# Patient Record
Sex: FEMALE | Race: BLACK OR AFRICAN AMERICAN
[De-identification: names, ages, dates, MRNs, and addresses within clinical notes are randomized per-mention and may not be internally consistent; named-entity substitution may affect disease eponyms.]

---

## 2017-02-08 ENCOUNTER — HOSPITAL ENCOUNTER (OUTPATIENT)
Dept: HOSPITAL 62 - WI | Age: 47
End: 2017-02-08
Attending: CLINIC/CENTER
Payer: OTHER GOVERNMENT

## 2017-02-08 DIAGNOSIS — Z12.31: Primary | ICD-10-CM

## 2017-02-08 PROCEDURE — G0202 SCR MAMMO BI INCL CAD: HCPCS

## 2017-02-08 PROCEDURE — 77067 SCR MAMMO BI INCL CAD: CPT

## 2017-02-16 ENCOUNTER — HOSPITAL ENCOUNTER (EMERGENCY)
Dept: HOSPITAL 62 - ER | Age: 47
Discharge: HOME | End: 2017-02-16
Payer: OTHER GOVERNMENT

## 2017-02-16 VITALS — SYSTOLIC BLOOD PRESSURE: 135 MMHG | DIASTOLIC BLOOD PRESSURE: 79 MMHG

## 2017-02-16 DIAGNOSIS — R07.9: Primary | ICD-10-CM

## 2017-02-16 LAB
ALBUMIN SERPL-MCNC: 4.2 G/DL (ref 3.5–5)
ALP SERPL-CCNC: 71 U/L (ref 38–126)
ALT SERPL-CCNC: 29 U/L (ref 9–52)
ANION GAP SERPL CALC-SCNC: 10 MMOL/L (ref 5–19)
AST SERPL-CCNC: 24 U/L (ref 14–36)
BASOPHILS # BLD AUTO: 0 10^3/UL (ref 0–0.2)
BASOPHILS NFR BLD AUTO: 0.6 % (ref 0–2)
BILIRUB DIRECT SERPL-MCNC: 0 MG/DL (ref 0–0.3)
BILIRUB SERPL-MCNC: 0.5 MG/DL (ref 0.2–1.3)
BUN SERPL-MCNC: 8 MG/DL (ref 7–20)
CALCIUM: 9.4 MG/DL (ref 8.4–10.2)
CHLORIDE SERPL-SCNC: 99 MMOL/L (ref 98–107)
CK MB SERPL-MCNC: < 0.22 NG/ML (ref ?–4.55)
CK SERPL-CCNC: 51 U/L (ref 30–135)
CO2 SERPL-SCNC: 30 MMOL/L (ref 22–30)
CREAT SERPL-MCNC: 0.8 MG/DL (ref 0.52–1.25)
EOSINOPHIL # BLD AUTO: 0 10^3/UL (ref 0–0.6)
EOSINOPHIL NFR BLD AUTO: 0.4 % (ref 0–6)
ERYTHROCYTE [DISTWIDTH] IN BLOOD BY AUTOMATED COUNT: 14.5 % (ref 11.5–14)
GLUCOSE SERPL-MCNC: 80 MG/DL (ref 75–110)
HCT VFR BLD CALC: 37.6 % (ref 36–47)
HGB BLD-MCNC: 12.2 G/DL (ref 12–15.5)
HGB HCT DIFFERENCE: -1
LYMPHOCYTES # BLD AUTO: 2 10^3/UL (ref 0.5–4.7)
LYMPHOCYTES NFR BLD AUTO: 30 % (ref 13–45)
MCH RBC QN AUTO: 25.9 PG (ref 27–33.4)
MCHC RBC AUTO-ENTMCNC: 32.5 G/DL (ref 32–36)
MCV RBC AUTO: 80 FL (ref 80–97)
MONOCYTES # BLD AUTO: 0.5 10^3/UL (ref 0.1–1.4)
MONOCYTES NFR BLD AUTO: 7.6 % (ref 3–13)
NEUTROPHILS # BLD AUTO: 4.1 10^3/UL (ref 1.7–8.2)
NEUTS SEG NFR BLD AUTO: 61.4 % (ref 42–78)
POTASSIUM SERPL-SCNC: 4.2 MMOL/L (ref 3.6–5)
PROT SERPL-MCNC: 7.8 G/DL (ref 6.3–8.2)
RBC # BLD AUTO: 4.71 10^6/UL (ref 3.72–5.28)
SODIUM SERPL-SCNC: 139.3 MMOL/L (ref 137–145)
TROPONIN I SERPL-MCNC: < 0.012 NG/ML
WBC # BLD AUTO: 6.7 10^3/UL (ref 4–10.5)

## 2017-02-16 PROCEDURE — 80053 COMPREHEN METABOLIC PANEL: CPT

## 2017-02-16 PROCEDURE — 93005 ELECTROCARDIOGRAM TRACING: CPT

## 2017-02-16 PROCEDURE — 82550 ASSAY OF CK (CPK): CPT

## 2017-02-16 PROCEDURE — 82553 CREATINE MB FRACTION: CPT

## 2017-02-16 PROCEDURE — 84484 ASSAY OF TROPONIN QUANT: CPT

## 2017-02-16 PROCEDURE — 99285 EMERGENCY DEPT VISIT HI MDM: CPT

## 2017-02-16 PROCEDURE — 85025 COMPLETE CBC W/AUTO DIFF WBC: CPT

## 2017-02-16 PROCEDURE — 93010 ELECTROCARDIOGRAM REPORT: CPT

## 2017-02-16 PROCEDURE — 36415 COLL VENOUS BLD VENIPUNCTURE: CPT

## 2017-02-16 PROCEDURE — 71010: CPT

## 2017-02-16 NOTE — ER DOCUMENT REPORT
ED Medical Screen (RME)





- General


Stated Complaint: CHEST PAIN


Notes: 


patient is a 46 year old 


4 days ago, substernal pressure that is constant without any wax/waning. hurts 

worse with deep breathing. 





-tobacco use, BC, recent travel, recent surgery, cancer h/o





-htn, hld, GERD


+Dm





I have greeted and performed a rapid initial assessment of this patient. A 

comprehensive ED assessment and evaluation of the patient, analysis of test 

results and completion of the medical decision making process will be conducted 

by additional ED providers.


TRAVEL OUTSIDE OF THE [place] IN LAST 30 DAYS: No





- Related Data


Allergies/Adverse Reactions: 


 





hydrocodone Adverse Reaction (Verified 06/16/16 08:11)


 











Past Medical History





- Past Medical History


Cardiac Medical History: 


   Denies: Hx Coronary Artery Disease, Hx Heart Attack, Hx Hypertension


Pulmonary Medical History: 


   Denies: Hx Asthma, Hx Bronchitis, Hx COPD, Hx Pneumonia


Neurological Medical History: Denies: Hx Cerebrovascular Accident, Hx Seizures


Musculoskeltal Medical History: Reports Hx Arthritis - hands and knees 





- Immunizations


Hx Diphtheria, Pertussis, Tetanus Vaccination: Yes





Physical Exam





- Vital signs


Vitals: 





 











Temp Pulse BP Pulse Ox


 


 98.0 F   72   137/79 H  98 


 


 02/16/17 13:20  02/16/17 13:20  02/16/17 13:20  02/16/17 13:20














Course





- Vital Signs


Vital signs: 





 











Temp Pulse Resp BP Pulse Ox


 


 98.0 F   72      137/79 H  98 


 


 02/16/17 13:20  02/16/17 13:20     02/16/17 13:20  02/16/17 13:20

## 2017-02-16 NOTE — ER DOCUMENT REPORT
ED Cardiac





- General


Chief Complaint: Chest Pain


Stated Complaint: CHEST PAIN


Notes: 


The patient is a 46-year-old female, past medical history prediabetes, presents 

with 3 days of right sternal chest pain that is worse when she moves.  She is 

never had this in the past.  Does not remember lifting any heavy objects.  She 

says the pain is worse when she pushes on the area.  Denies shortness of breath

, nausea, vomiting, leg swelling, recent travel, recent surgery, history of 

malignancy, birth control use, cough, fevers or back pain.


TRAVEL OUTSIDE OF THE [place] IN LAST 30 DAYS: No





- Related Data


Allergies/Adverse Reactions: 


 





hydrocodone Adverse Reaction (Verified 02/16/17 13:24)


 











Past Medical History





- General


Information source: Patient





- Social History


Smoking Status: Never Smoker


Chew tobacco use (# tins/day): No


Frequency of alcohol use: None


Drug Abuse: None


Family History: Reviewed & Not Pertinent


Patient has suicidal ideation: No


Patient has homicidal ideation: No





- Past Medical History


Cardiac Medical History: 


   Denies: Hx Coronary Artery Disease, Hx Heart Attack, Hx Hypertension


Pulmonary Medical History: 


   Denies: Hx Asthma, Hx Bronchitis, Hx COPD, Hx Pneumonia


Neurological Medical History: Denies: Hx Cerebrovascular Accident, Hx Seizures


Renal/ Medical History: Denies: Hx Peritoneal Dialysis


Musculoskeltal Medical History: Reports Hx Arthritis - hands and knees 


Past Surgical History: Reports: Hx Orthopedic Surgery - R SHOULDER REPLACEMENT, 

Hx Thyroid Surgery, Hx Tubal Ligation





- Immunizations


Hx Diphtheria, Pertussis, Tetanus Vaccination: Yes





Review of Systems





- Review of Systems


Notes: 


REVIEW OF SYSTEMS:


CONSTITUTIONAL: -fevers, -chills


EENT: -eye pain, -difficulty swallowing, -nasal congestion


CARDIOVASCULAR: +chest pain, -syncope.


RESPIRATORY: -cough, -SOB


GASTROINTESTINAL: -abdominal pain, - nausea, -vomiting, -diarrhea


GENITOURINARY: -dysuria, -hematuria


MUSCULOSKELETAL: -back pain, -neck pain


SKIN: -rash or skin lesions.


HEMATOLOGIC: -easy bruising or bleeding.


LYMPHATIC: -swollen, enlarged glands.


NEUROLOGICAL: -altered mental status or loss of consciousness, -headache, -

neurologic symptoms


PSYCHIATRIC: -anxiety, -depression.


ALL OTHER SYSTEMS REVIEWED AND NEGATIVE.





Physical Exam





- Vital signs


Vitals: 


 











Temp Pulse BP Pulse Ox


 


 98.0 F   72   137/79 H  98 


 


 02/16/17 13:20  02/16/17 13:20  02/16/17 13:20  02/16/17 13:20














- Notes


Notes: 


PHYSICAL EXAMINATION:





GENERAL: Well-appearing, well-nourished and in no acute distress.





HEAD: Atraumatic, normocephalic.





EYES: Pupils equal round and reactive to light, extraocular movements intact, 

sclera anicteric, conjunctiva are normal.





ENT: nares patent, oropharynx clear without exudates.  Moist mucous membranes.





NECK: Normal range of motion, supple without lymphadenopathy





LUNGS: Breath sounds clear to auscultation bilaterally and equal.  No wheezes 

rales or rhonchi.





HEART: Right mid-sternal tenderness. Regular rate and rhythm without murmurs





ABDOMEN: Soft, nontender, normoactive bowel sounds.  No guarding, no rebound.  

No masses appreciated.





EXTREMITIES: Normal range of motion, no pitting or edema.  No cyanosis.





NEUROLOGICAL: Cranial nerves grossly intact.  Normal speech, normal gait.  

Normal sensory, motor, and reflex exams.





PSYCH: Normal mood, normal affect.





SKIN: Warm, Dry, normal turgor, no rashes or lesions noted.





Course





- Re-evaluation


Re-evalutation: 


2 sets of troponins are negative. HEART score of 2. PERC negative.  Symptoms 

atypical for aortic dissection.  Patient says she will call her primary care 

physician tomorrow morning for an appointment. Given strict return precautions 

and she understands.





- Vital Signs


Vital signs: 


 











Temp Pulse Resp BP Pulse Ox


 


 98.0 F   72      137/79 H  98 


 


 02/16/17 13:20  02/16/17 13:20     02/16/17 13:20  02/16/17 13:20














- Laboratory


Result Diagrams: 


 02/16/17 13:30





 02/16/17 13:30


Laboratory results interpreted by me: 


 











  02/16/17





  13:30


 


MCH  25.9 L


 


RDW  14.5 H














Discharge





- Discharge


Clinical Impression: 


Chest pain


Qualifiers:


 Chest pain type: unspecified Qualified Code(s): R07.9 - Chest pain, unspecified





Condition: Good


Disposition: HOME, SELF-CARE


Additional Instructions: 


Call your primary care physician tomorrow to make an appointment to have your 

chest pain rechecked.  Return immediately to the emergency room if he noticed 

worsening chest pain or shortness of breath.








CHEST PAIN OF UNCLEAR CAUSE:





     The exact cause of your chest pain isn't clear.  Fortunately, there is no 

evidence of a dangerous medical condition.  Further testing may be required to 

find the source of the pain.


     Most often, we find that this pain is coming from the chest wall -- the 

muscles or rib joints in the chest.  But chest pain can come from the lung and 

lung lining, the esophagus, the heart valves or heart lining, and even the 

stomach or gallbladder.


     Rest.  Eat lightly until the pain is gone.  We may prescribe medicine for 

pain and inflammation.


     You should call the physician immediately if the pain radiates to the 

shoulder, jaw or arms; if you start to run a fever or develop a cough; or if 

you develop shortness of breath, or other new or alarming symptoms.








NORMAL EXAM AND WORKUP:


     At this time, your examination and workup show no significant abnormality.

  No significant abnormal physical findings were noted.  All laboratory, EKG, 

and imaging (x-ray, CT scans, ultrasound) studies that were ordered show no 

significant abnormality.


     Although your examination and all studies that were ordered showed no 

significant abnormal finding, there are no examinations and no studies that are 

100% accurate.  There is always the possibility that some abnormality could 

exist and not be detected with physical examination or within the limits and 

capabilities of laboratory and other studies.


     You should return or follow up as you were instructed on your visit today 

for further evaluation if your symptoms do not resolve.








CHEST WALL PAIN:


     Your chest pain may be coming from the chest wall. This is often caused by 

straining the muscles or joints in the chest during physical activity, direct 

trauma, coughing, or vigorous vomiting.  Persons with arthritis are especially 

prone to this type of pain, due to inflammation of the cartilage joints near 

the breast bone. Occasionally, no cause can be found.


     Rest from strenuous physical activity.  This kind of chest pain is usually 

made worse by movement of the chest.  Depending on the symptoms, we may 

prescribe medicine for pain, muscle relaxation, and antiinflammatory effects.


     If the pain is new, and seems to be due to muscle strain, cold packs can 

help.  Otherwise, apply gentle warmth to the painful area for 15 minutes every 

hour or two.


     You should call contact the doctor immediately if things change. Further 

evaluation is needed if you develop a fever or cough, if the nature of the pain 

changes, or if you become short of breath.








FOLLOW-UP CARE:


If you have been referred to a physician for follow-up care, call the physician

s office for an appointment as you were instructed or within the next two days.

  If you experience worsening or a significant change in your symptoms, notify 

the physician immediately or return to the Emergency Department at any time for 

re-evaluation.

## 2017-02-17 NOTE — EKG REPORT
SEVERITY:- ABNORMAL ECG -

SINUS RHYTHM

VENTRICULAR PREMATURE COMPLEX

RIGHT AXIS DEVIATION

PROBABLE INFERIOR INFARCT, OLD

:

Confirmed by: Elgin Valverde 17-Feb-2017 12:34:49

## 2017-05-17 ENCOUNTER — HOSPITAL ENCOUNTER (OUTPATIENT)
Dept: HOSPITAL 62 - WI | Age: 47
End: 2017-05-17
Attending: CLINIC/CENTER
Payer: OTHER GOVERNMENT

## 2017-05-17 DIAGNOSIS — R59.0: ICD-10-CM

## 2017-05-17 DIAGNOSIS — N64.4: Primary | ICD-10-CM

## 2017-05-17 PROCEDURE — 76642 ULTRASOUND BREAST LIMITED: CPT

## 2017-06-14 ENCOUNTER — HOSPITAL ENCOUNTER (OUTPATIENT)
Dept: HOSPITAL 62 - RAD | Age: 47
End: 2017-06-14
Attending: SURGERY
Payer: OTHER GOVERNMENT

## 2017-06-14 DIAGNOSIS — R59.0: Primary | ICD-10-CM

## 2017-06-14 DIAGNOSIS — N63: ICD-10-CM

## 2017-06-14 PROCEDURE — 77059: CPT

## 2017-06-14 PROCEDURE — C8906 MRI W/CONT, BREAST,  BI: HCPCS

## 2017-06-14 PROCEDURE — A9576 INJ PROHANCE MULTIPACK: HCPCS

## 2017-06-15 ENCOUNTER — HOSPITAL ENCOUNTER (OUTPATIENT)
Dept: HOSPITAL 62 - WI | Age: 47
End: 2017-06-15
Attending: SURGERY
Payer: OTHER GOVERNMENT

## 2017-06-15 DIAGNOSIS — C50.412: Primary | ICD-10-CM

## 2017-06-15 DIAGNOSIS — C77.3: ICD-10-CM

## 2017-06-15 PROCEDURE — 19084 BX BREAST ADD LESION US IMAG: CPT

## 2017-06-15 PROCEDURE — 88342 IMHCHEM/IMCYTCHM 1ST ANTB: CPT

## 2017-06-15 PROCEDURE — 88341 IMHCHEM/IMCYTCHM EA ADD ANTB: CPT

## 2017-06-15 PROCEDURE — 19083 BX BREAST 1ST LESION US IMAG: CPT

## 2017-06-15 PROCEDURE — 0HBU3ZX EXCISION OF LEFT BREAST, PERCUTANEOUS APPROACH, DIAGNOSTIC: ICD-10-PCS | Performed by: SURGERY

## 2017-06-15 PROCEDURE — 88305 TISSUE EXAM BY PATHOLOGIST: CPT

## 2017-06-15 NOTE — RADIOLOGY REPORT (SQ)
EXAM DESCRIPTION:  MRI BREAST BILAT W AND/OR WO



COMPLETED DATE/TIME:  6/14/2017 8:49 am



REASON FOR STUDY:  AXILLARY LYMPHADENOPATHY (R59.0) R59.0  LOCALIZED ENLARGED LYMPH NODES



COMPARISON:  Mammography and ultrasound



PATHOLOGIC CORRELATION:  None.



CONTRAST TYPE AND DOSE:  20 mL Prohance.



RENAL FUNCTION:  None required. The patient is less than 50 years old.



TECHNIQUE:  MR imaging performed with a dedicated breast coil. Pre contrast T1 and T2 weighted images
.  Pre contrast and post contrast enhanced T1 weighted images with fat saturation.

Subtraction images, 3D thick and thin MIPS, and kinetic analysis performed on an independent workstat
ion. ([employer] workstation)

Magnet strength: 1.5 T



LIMITATIONS:  None.



FINDINGS:  BREAST DENSITY: b. There are scattered areas of fibroglandular density.

BACKGROUND PARENCHYMAL ENHANCEMENT:Mild.

RIGHT BREAST: No enhancing or suspicious masses.   No clumped, regional/segmental ductal enhancement.


CHEST WALL: Normal tissue planes. No abnormal internal mammary nodes.

AXILLA:  Normal axillary and retro-pectoral nodes.

LEFT BREAST:There are multiple micro lobular heterogeneously enhancing masses in the axillary tail of
 the breast extending to the axilla.  Type 3 enhancement curves.  The largest mass is 5 cm in maximum
 diameter.  There is generalize asymmetric enhancement through the entire breast.  There is skin thic
kening.

CHEST WALL:  Normal tissue planes. No abnormal internal mammary nodes.

 AXILLA: Enlarged axillary nodes just adjacent to the dominant mass which is either an axillary node 
or mass in the tail of the breast.

OTHER:No identified liver, bone, or lung lesions. No other significant incidental findings.



IMPRESSION:  BI-RADS 5 left breast.  Axillary adenopathy with diffuse breast enhancement and skin thi
ckening all worrisome for inflammatory breast carcinoma.



BIRAD:  RIGHT BREAST: 1 Negative.

LEFT BREAST: 5 Highly suggestive of malignancy. Appropriate action should be taken.



RECOMMENDATION:  RECOMMENDED FOLLOW-UP: Ultrasound-guided core biopsy of the dominant mass in the lef
t breast.



TECHNICAL DOCUMENTATION:  JOB ID:  [id number]

 2011 [employer]- All Rights Reserved

## 2017-06-20 ENCOUNTER — HOSPITAL ENCOUNTER (OUTPATIENT)
Dept: HOSPITAL 62 - OROUT | Age: 47
Discharge: HOME | End: 2017-06-20
Attending: SURGERY
Payer: OTHER GOVERNMENT

## 2017-06-20 VITALS — SYSTOLIC BLOOD PRESSURE: 131 MMHG | DIASTOLIC BLOOD PRESSURE: 87 MMHG

## 2017-06-20 DIAGNOSIS — E03.9: ICD-10-CM

## 2017-06-20 DIAGNOSIS — M06.9: ICD-10-CM

## 2017-06-20 DIAGNOSIS — C50.912: Primary | ICD-10-CM

## 2017-06-20 DIAGNOSIS — R59.0: ICD-10-CM

## 2017-06-20 DIAGNOSIS — F32.9: ICD-10-CM

## 2017-06-20 DIAGNOSIS — Z87.891: ICD-10-CM

## 2017-06-20 DIAGNOSIS — Z79.899: ICD-10-CM

## 2017-06-20 LAB
ALBUMIN SERPL-MCNC: 4.1 G/DL (ref 3.5–5)
ALP SERPL-CCNC: 70 U/L (ref 38–126)
ALT SERPL-CCNC: 25 U/L (ref 9–52)
ANION GAP SERPL CALC-SCNC: 9 MMOL/L (ref 5–19)
AST SERPL-CCNC: 25 U/L (ref 14–36)
BASOPHILS # BLD AUTO: 0 10^3/UL (ref 0–0.2)
BASOPHILS NFR BLD AUTO: 1 % (ref 0–2)
BILIRUB DIRECT SERPL-MCNC: 0.3 MG/DL (ref 0–0.4)
BILIRUB SERPL-MCNC: 0.6 MG/DL (ref 0.2–1.3)
BUN SERPL-MCNC: 10 MG/DL (ref 7–20)
CALCIUM: 9.2 MG/DL (ref 8.4–10.2)
CHLORIDE SERPL-SCNC: 105 MMOL/L (ref 98–107)
CO2 SERPL-SCNC: 28 MMOL/L (ref 22–30)
CREAT SERPL-MCNC: 0.6 MG/DL (ref 0.52–1.25)
EOSINOPHIL # BLD AUTO: 0 10^3/UL (ref 0–0.6)
EOSINOPHIL NFR BLD AUTO: 0.6 % (ref 0–6)
ERYTHROCYTE [DISTWIDTH] IN BLOOD BY AUTOMATED COUNT: 15.1 % (ref 11.5–14)
GLUCOSE SERPL-MCNC: 92 MG/DL (ref 75–110)
HCT VFR BLD CALC: 37.6 % (ref 36–47)
HGB BLD-MCNC: 11.8 G/DL (ref 12–15.5)
HGB HCT DIFFERENCE: -2.2
LYMPHOCYTES # BLD AUTO: 1.5 10^3/UL (ref 0.5–4.7)
LYMPHOCYTES NFR BLD AUTO: 32.6 % (ref 13–45)
MCH RBC QN AUTO: 25 PG (ref 27–33.4)
MCHC RBC AUTO-ENTMCNC: 31.5 G/DL (ref 32–36)
MCV RBC AUTO: 79 FL (ref 80–97)
MONOCYTES # BLD AUTO: 0.4 10^3/UL (ref 0.1–1.4)
MONOCYTES NFR BLD AUTO: 8.8 % (ref 3–13)
NEUTROPHILS # BLD AUTO: 2.6 10^3/UL (ref 1.7–8.2)
NEUTS SEG NFR BLD AUTO: 57 % (ref 42–78)
POTASSIUM SERPL-SCNC: 3.9 MMOL/L (ref 3.6–5)
PROT SERPL-MCNC: 7.9 G/DL (ref 6.3–8.2)
RBC # BLD AUTO: 4.74 10^6/UL (ref 3.72–5.28)
SODIUM SERPL-SCNC: 142.1 MMOL/L (ref 137–145)
WBC # BLD AUTO: 4.5 10^3/UL (ref 4–10.5)

## 2017-06-20 PROCEDURE — 81025 URINE PREGNANCY TEST: CPT

## 2017-06-20 PROCEDURE — C1752 CATH,HEMODIALYSIS,SHORT-TERM: HCPCS

## 2017-06-20 PROCEDURE — C1788 PORT, INDWELLING, IMP: HCPCS

## 2017-06-20 PROCEDURE — 36561 INSERT TUNNELED CV CATH: CPT

## 2017-06-20 PROCEDURE — 71010: CPT

## 2017-06-20 PROCEDURE — 19100 BX BREAST PERCUT W/O IMAGE: CPT

## 2017-06-20 PROCEDURE — C1769 GUIDE WIRE: HCPCS

## 2017-06-20 PROCEDURE — 88305 TISSUE EXAM BY PATHOLOGIST: CPT

## 2017-06-20 PROCEDURE — 05HM33Z INSERTION OF INFUSION DEVICE INTO RIGHT INTERNAL JUGULAR VEIN, PERCUTANEOUS APPROACH: ICD-10-PCS | Performed by: SURGERY

## 2017-06-20 PROCEDURE — 80053 COMPREHEN METABOLIC PANEL: CPT

## 2017-06-20 PROCEDURE — 0HBU3ZX EXCISION OF LEFT BREAST, PERCUTANEOUS APPROACH, DIAGNOSTIC: ICD-10-PCS | Performed by: SURGERY

## 2017-06-20 PROCEDURE — 85025 COMPLETE CBC W/AUTO DIFF WBC: CPT

## 2017-06-20 PROCEDURE — 77001 FLUOROGUIDE FOR VEIN DEVICE: CPT

## 2017-06-20 PROCEDURE — 36415 COLL VENOUS BLD VENIPUNCTURE: CPT

## 2017-06-20 NOTE — RADIOLOGY REPORT (SQ)
EXAM DESCRIPTION:  CHEST SINGLE VIEW



COMPLETED DATE/TIME:  6/20/2017 10:51 am



REASON FOR STUDY:  preop/ PACU 10



COMPARISON:  2/16/2017



EXAM PARAMETERS:  NUMBER OF VIEWS: One view.

TECHNIQUE: Single frontal radiographic view of the chest acquired.

RADIATION DOSE: NA

LIMITATIONS: None.



FINDINGS:  LUNGS AND PLEURA: No opacities, masses or pneumothorax. No pleural effusion.

MEDIASTINUM AND HILAR STRUCTURES: No masses.  Contour normal.

HEART AND VASCULAR STRUCTURES: Heart normal in size.  Normal vasculature.

BONES: No acute findings.

HARDWARE: None in the chest.

OTHER: No other significant finding.



IMPRESSION:  NO ACUTE RADIOGRAPHIC FINDING IN THE CHEST.



TECHNICAL DOCUMENTATION:  JOB ID:  [id number]

## 2017-06-20 NOTE — RADIOLOGY REPORT (SQ)
EXAM DESCRIPTION:  CHEST SINGLE VIEW



COMPLETED DATE/TIME:  6/20/2017 3:30 pm



REASON FOR STUDY:  post op pacu



COMPARISON:  Chest film 2/16/2017



EXAM PARAMETERS:  NUMBER OF VIEWS: One view.

TECHNIQUE: Single frontal radiographic view of the chest acquired.

RADIATION DOSE: NA

LIMITATIONS: None.



FINDINGS:  LUNGS AND PLEURA: No opacities, masses or pneumothorax. No pleural effusion.

MEDIASTINUM AND HILAR STRUCTURES: No masses.  Contour normal.

HEART AND VASCULAR STRUCTURES: Stable mild cardiomegaly.  Normal vasculature.

BONES: No acute findings.

HARDWARE: Right-sided permanent central line tip superior vena cava.

OTHER: No other significant finding.



IMPRESSION:  Post right permanent central line placement with the tip in the superior vena cava.  No 
pneumothorax.

Stable mild cardiomegaly



TECHNICAL DOCUMENTATION:  JOB ID:  [id number]

## 2017-06-20 NOTE — PDOC DISCHARGE SUMMARY
Discharge Summary (SDC)





- Discharge


Final Diagnosis: 


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


Date of Surgery: 06/20/17


Discharge Date: 06/20/17


Condition: Good


Treatment or Instructions: 


Right internal jugular single-lumen PowerPort placement.  Left breast punch 

skin biopsy.  Left breast core needle biopsies.  May discharge patient home 

when met discharge criteria.  Follow-up with me next week.  May shower 

tomorrow.  Take the Band-Aid off of her left breast tomorrow.


Prescriptions: 


Oxycodone HCl/Acetaminophen [Percocet 5-325 mg Tablet] 1 tab PO ASDIR PRN #25 

tablet


 PRN Reason: 


Discharge Diet: As Tolerated


Discharge Activity: Activity As Tolerated - Stay active but avoid strenuous 

activity.


Report the Following to Your Physician Immediately: Fever over 101 Degrees, 

Unusual Bleeding, Drainage-Foul Smelling

## 2017-06-20 NOTE — RADIOLOGY REPORT (SQ)
EXAM DESCRIPTION:  FLUORO/CV PLACEMENT



COMPLETED DATE/TIME:  6/20/2017 2:29 pm



REASON FOR STUDY:  PORTACATH C50.912  MALIGNANT NEOPLASM OF UNSPECIFIED SITE OF LEFT FEMAL



COMPARISON:  AP chest 6/20/2017



FLUOROSCOPY TIME:  14.1 minutes

12 series of digital  images saved to PACS.



TECHNIQUE:  Intra-operative images acquired during surgical procedure to evaluate progress.

NUMBER OF IMAGES: 12 series of digital fluoro images



LIMITATIONS:  None.



FINDINGS:  Intra procedural imaging and fluoro during placement of a right-sided permanent central li
ne with the tip in the superior vena cava.  Please see the operative report for further details



IMPRESSION:  Intra procedural imaging and fluoro



COMMENT:  Quality :  Final reports for procedures using fluoroscopy that document radiation exp
osure indices, or exposure time and number of fluorographic images (if radiation exposure indices are
 not available)

Please consult full operative report of the attending physician for description of the procedure.



TECHNICAL DOCUMENTATION:  JOB ID:  [id number]

 2011 [employer]- All Rights Reserved

## 2017-06-21 NOTE — WOMENS IMAGING REPORT
EXAM DESCRIPTION:  U/S BREAST BX; U/S BREAST BX EACH ADDT'L



COMPLETED DATE/TIME:  6/15/2017 12:27 pm; 6/15/2017 12:28 pm



REASON FOR STUDY:  R59.0 R59.0  LOCALIZED ENLARGED LYMPH NODES



COMPARISON:  MRI breast 6/14/2017, mammograms and breast ultrasound 5/17/2017



TECHNIQUE:  The procedure was discussed with the patient and the patient agreed to proceed.  Enlarged
 left axillary lymph node was targeted.  There was a vague area of breast parenchymal shadowing in th
e far upper outer quadrant adjacent to the lymph node which was also targeted.

The patient was scanned and the area of interest in the far upper outer quadrant left breast.  An enl
arged axillary lymph node was localized, with a vague area of breast parenchymal shadowing in the far
 upper outer quadrant.  These areas correlate with the area of concern on prior imaging studies.  The
se areas were targeted for ultrasound-guided core biopsy.

LEFT AXILLA:

After sterile skin prep and 3.5 mL local lidocaine 1% for skin and deep tissue anesthesia, a 14 gauge
 coaxial core biopsy needle was used to obtain several cores of tissue from the lesion.  Under ultras
ound guidance, a ribbon clip was placed in the areas sampled.  There were no immediate post-procedure
 complications.

LEFT UPPER OUTER QUADRANT:

After sterile skin prep and 3 mL local lidocaine 1% for skin and deep tissue anesthesia, a 14 gauge c
oaxial core biopsy needle was used to obtain several cores of tissue from the lesion.  Under ultrasou
nd guidance, a ribbon clip was placed in the areas sampled.  There were no immediate post-procedure c
omplications.

MAMMOGRAM: Post-procedure two view mammogram was not acquired in the digital mammogram suite.

Pathology yields a diagnosis of metastatic high-grade carcinoma in the left axillary lymph node and i
n the upper outer quadrant breast parenchyma

Pathology is concordant.



LIMITATIONS:  None.



FINDINGS:  Ultrasound guided breast biopsy as described above.

POST PROCEDURE MAMMOGRAMS FOR MARKER PLACEMENT: No



IMPRESSION:  ULTRASOUND-GUIDED CORE BIOPSY OF THE LEFT BREAST YIELDS A DIAGNOSIS OF MALIGNANCY



COMMENT:  COMMUNICATION: RESULTS OF THE BIOPSY WERE DISCUSSED WITH THE PATIENT, 6/19/2017 1700 HOURS

Patient medication list reviewed: Yes- Quality ID# [id number]:Eligible professional attests to documenting i
n the medical record they obtained, updated, or reviewed the patient's current medications.



TECHNICAL DOCUMENTATION:  JOB ID:  [id number]

 2011 [employer]- All Rights Reserved

## 2017-06-21 NOTE — WOMENS IMAGING REPORT
EXAM DESCRIPTION:  U/S BREAST BX; U/S BREAST BX EACH ADDT'L



COMPLETED DATE/TIME:  6/15/2017 12:27 pm; 6/15/2017 12:28 pm



REASON FOR STUDY:  R59.0 R59.0  LOCALIZED ENLARGED LYMPH NODES



COMPARISON:  MRI breast 6/14/2017, mammograms and breast ultrasound 5/17/2017



TECHNIQUE:  The procedure was discussed with the patient and the patient agreed to proceed.  Enlarged
 left axillary lymph node was targeted.  There was a vague area of breast parenchymal shadowing in th
e far upper outer quadrant adjacent to the lymph node which was also targeted.

The patient was scanned and the area of interest in the far upper outer quadrant left breast.  An enl
arged axillary lymph node was localized, with a vague area of breast parenchymal shadowing in the far
 upper outer quadrant.  These areas correlate with the area of concern on prior imaging studies.  The
se areas were targeted for ultrasound-guided core biopsy.

LEFT AXILLA:

After sterile skin prep and 3.5 mL local lidocaine 1% for skin and deep tissue anesthesia, a 14 gauge
 coaxial core biopsy needle was used to obtain several cores of tissue from the lesion.  Under ultras
ound guidance, a ribbon clip was placed in the areas sampled.  There were no immediate post-procedure
 complications.

LEFT UPPER OUTER QUADRANT:

After sterile skin prep and 3 mL local lidocaine 1% for skin and deep tissue anesthesia, a 14 gauge c
oaxial core biopsy needle was used to obtain several cores of tissue from the lesion.  Under ultrasou
nd guidance, a ribbon clip was placed in the areas sampled.  There were no immediate post-procedure c
omplications.

MAMMOGRAM: Post-procedure two view mammogram was not acquired in the digital mammogram suite.

Pathology yields a diagnosis of metastatic high-grade carcinoma in the left axillary lymph node and i
n the upper outer quadrant breast parenchyma

Pathology is concordant.



LIMITATIONS:  None.



FINDINGS:  Ultrasound guided breast biopsy as described above.

POST PROCEDURE MAMMOGRAMS FOR MARKER PLACEMENT: No



IMPRESSION:  ULTRASOUND-GUIDED CORE BIOPSY OF THE LEFT BREAST YIELDS A DIAGNOSIS OF MALIGNANCY



COMMENT:  COMMUNICATION: RESULTS OF THE BIOPSY WERE DISCUSSED WITH THE PATIENT, 6/19/2017 1700 HOURS

Patient medication list reviewed: Yes- Quality ID# [id number]:Eligible professional attests to documenting i
n the medical record they obtained, updated, or reviewed the patient's current medications.



TECHNICAL DOCUMENTATION:  JOB ID:  [id number]

 2011 [employer]- All Rights Reserved

## 2017-06-22 NOTE — OPERATIVE REPORT
Operative Report


DATE OF SURGERY: 06/20/17


PREOPERATIVE DIAGNOSIS: Left breast cancer.


POSTOPERATIVE DIAGNOSIS: Left breast cancer


OPERATION: Right internal jugular single-lumen PowerPort placement (permanent 

implanted central venous access placed under fluoroscopic and ultrasound 

guidance).  Left breast skin punch biopsy.  Left breast core needle biopsies.


SURGEON: MARY VENTURA ASSISTANT: LENNOX WILLIAMS


ANESTHESIA: LMAC


TISSUE REMOVED OR ALTERED: Left breast periareolar skin punch biopsy.  Left 

upper outer breast multiple core needle biopsies.


COMPLICATIONS: 


None


ESTIMATED BLOOD LOSS: 30 cc


INTRAOPERATIVE FINDINGS: Fullness in the left upper quadrant of the left breast 

along with subtle skin thickening along with nipple retraction.  Large mass at 

the left axilla.


PROCEDURE: 


Informed consent was obtained.  Patient was brought to the operating room 

placed on the operating table in the supine position.  Procedure was done under 

LMAC.  Her bilateral breast and chest and neck were prepped and draped in the 

usual sterile fashion.  After administration of local anesthetic the right 

subclavian vein was entered and guidewire was placed.  However the guidewire 

kept on going across to the left side.  Despite multiple technical measures.  A 

Glidewire was used and still it kept him going to the left side.  The needle 

was pulled and a different angle of approach was taken entering the left 

subclavian vein.  After multiple manipulations of the Glidewire the Glidewire 

finally appeared to go down to what appeared to be the superior vena cava and 

the right atrium.  However the positioning looked slightly more medial than I 

would expect therefore after placement of a angiocatheter through the Glidewire

, contrast study was obtained.  It demonstrated a variant appearing anatomy.  

At this point I obtain help from Dr. Lennox Williams (vascular surgeon) who 

placed a angiocatheter into the right internal jugular vein under ultrasound 

guidance.  The Glidewire appeared to go to in the same location as the 

subclavian approach.  Contrast was injected into the right internal jugular 

vein and again it showed the same a variant appearing anatomy.  However it was 

100% certain that it was then the vein.  Therefore a introducer catheter was 

placed.  A right upper chest subcutaneous pocket was created.  Single-lumen 

PowerPort catheter was then tunneled between the 2 incisions and the catheter 

was fed into the superior vena cava through the introducer catheter.  The 

catheter was cut to length and attached to the PowerPort device which was then 

implanted into the subcutaneous pocket.  Fluoroscopic views demonstrated good 

positioning of the catheter.  Hemostasis appeared excellent.  The PowerPort 

withdrew blood and flushed easily.  All skin incisions were closed with 

subcuticular interrupted Vicryl sutures followed by Dermabond closure.





Patient had a subtle skin thickening diffusely of her left breast more 

pronounced than the left upper and left upper outer breast in the periareolar 

region with associated nipple retraction.  At the periareolar region at the 

upper outer region local anesthetic was administered and a 4 mm punch biopsy of 

the skin was obtained. the specimen was submitted to pathology.  Hemostasis was 

achieved with electrocautery in the subcutaneous tissue and the wound was 

closed with interrupted nylon sutures.  Patient had fullness of the left upper 

outer breast but not a distinct mass in this area.  She did have a large mass 

in her left axilla.  After administration of local anesthetic a incision was 

made in the skin allowing placement of a core needle biopsy device and multiple 

core needle biopsies were taken of the left upper outer breast.  The small stab 

incision was closed with interrupted nylon suture.  Patient tolerated procedure 

well with no apparent complications and was taken to the recovery area in 

stable condition.

## 2017-06-26 ENCOUNTER — HOSPITAL ENCOUNTER (OUTPATIENT)
Dept: HOSPITAL 62 - RAD | Age: 47
End: 2017-06-26
Attending: INTERNAL MEDICINE
Payer: OTHER GOVERNMENT

## 2017-06-26 DIAGNOSIS — C50.412: Primary | ICD-10-CM

## 2017-06-26 PROCEDURE — A9560 TC99M LABELED RBC: HCPCS

## 2017-06-26 PROCEDURE — 78472 GATED HEART PLANAR SINGLE: CPT

## 2017-06-26 NOTE — RADIOLOGY REPORT (SQ)
EXAM DESCRIPTION:  NM MUGA REST



COMPLETED DATE/TIME:  6/26/2017 1:17 pm



REASON FOR STUDY:  MAL DANELLE OF UPPER OTHER QUADRANT OF LEFT FEMALE BREAST C50.412  MALIG NEOPLASM OF U
PPER-OUTER QUADRANT OF LEFT FEMAL Z08  ENCNTR FOR FOLLOW-UP EXAM AFTER TRTMT FOR MALIGNANT NEOP Z51.1
1  ENCOUNTER FOR ANTINEOPLASTIC CHEMOTHERAPY



COMPARISON:  No previous



RADIONUCLIDE AND DOSE:  26.9 mCi of technetium 99 M pyrophosphate was tagged to the patient's own red
 cells for MUGA scan



TECHNIQUE:  Following administration of the radionuclide, gated images of the heart are obtained in t
hree projections.  Left ventricular functional analysis performed.



LIMITATIONS:  None.



FINDINGS:   LEFT VENTRICULAR FUNCTION:

EJECTION FRACTION: 61%.

END-DIASTOLIC VOLUME: 199 mL.

END-SYSTOLIC VOLUME: 66 mL.

WALL MOTION: No focal wall motion abnormalities.

OTHER: No other significant finding.



IMPRESSION:  NORMAL CARDIAC MUGA STUDY.  Estimated left ventricular ejection fraction 61%.



TECHNICAL DOCUMENTATION:  JOB ID:  [id number]

 2011 [employer]- All Rights Reserved

## 2017-10-22 ENCOUNTER — HOSPITAL ENCOUNTER (OUTPATIENT)
Dept: HOSPITAL 62 - RAD | Age: 47
End: 2017-10-22
Attending: INTERNAL MEDICINE
Payer: OTHER GOVERNMENT

## 2017-10-22 DIAGNOSIS — C50.412: Primary | ICD-10-CM

## 2017-10-22 PROCEDURE — 78815 PET IMAGE W/CT SKULL-THIGH: CPT

## 2017-10-22 PROCEDURE — A9552 F18 FDG: HCPCS

## 2017-10-23 NOTE — RADIOLOGY REPORT (SQ)
EXAM DESCRIPTION:  PET CT SKULL/THIGH



COMPLETED DATE/TIME:  10/22/2017 6:57 pm



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



COMPARISON:  Bilateral breast MRI 6/14/2017

PET-CT [facility] 6/28/2017



RADIONUCLIDE AND DOSE:  11.7 mCi F18 FDG

The route of agent administration: Intravenous



FASTING BLOOD SUGAR:  88 mg/dl



CONTRAST TYPE AND DOSE:  No CT contrast given.



TECHNIQUE:  Blood glucose level was verified.  Above dose of FDG was injected intravenously.  2-D seg
mented attenuation correction images were obtained from the base of the skull to the midthighs.  Nonc
ontrast CT images were obtained for attenuation correction and fusion with emission images.  CT image
s were performed without oral or intravenous contrast and are not sensitive for parenchymal lesions. 
 A series of overlapping emission PET images were obtained.  Images reviewed and manipulated at [facility] work station by the radiologist.  Images stored on PACS.



LIMITATIONS:  None.



FINDINGS:  HEAD AND NECK: No areas of abnormal metabolic activity in the soft tissues of the head and
 neck.  Left supraclavicular adenopathy seen on 6/28/2017 has resolved.

CHEST:  There is soft tissue activity inferior to the left axillary lymph node without a measurable c
orresponding mass by CT.  SUV in this area is 3.5, of uncertain clinical significance.  Corresponding
 CT images 74-76 demonstrate no focal mass.

There is a left axillary lymph node 3 x 2.4 cm in size with SUV less than 1 (was 7.3 x 4 cm in size w
ith SUV of 9 on 6/28/2017 PET-CT).

Left lateral rib activity described 6/28/2017 is no longer seen.

Right hilar metabolically active lymph nodes seen on 6/28/2017 are no longer identified.

ABDOMEN AND PELVIS: No areas of abnormal metabolic activity in the abdomen or pelvis.  Expected physi
ologic activity is present in the genitourinary system and bowel.

PROXIMAL LOWER EXTREMITIES: No areas of abnormal metabolic activity in the soft tissues of the lower 
extremities.

BONES: No abnormal bony activity on today's study.  No sclerotic lesions in the spine are identified 
today.

ADDITIONAL CT FINDINGS: Right permanent central line catheter tip in the superior vena cava.  Diffuse
 left breast periareolar skin thickening without increased metabolic activity.

OTHER: Liver background SUV 1.4.  Blood pool background SUV 1.1.



IMPRESSION:  Enlarged hypermetabolic left axillary lymph node seen on 6/28/2017 is non metabolic on t
kate's study.

Inferior to the left axillary lymph node, there is a bandlike area of increased activity SUV 3.5, wit
hout a CT discernible mass.  This is of uncertain clinical significance.

Left supraclavicular hypermetabolic nodes seen 6/28/2017 have resolved.

No hypermetabolic lesions in the lower thoracic spine, left lateral ribs or L1 level.



TECHNICAL DOCUMENTATION:  JOB ID:  [id number]

 2011 [employer]- All Rights Reserved

## 2017-10-31 ENCOUNTER — HOSPITAL ENCOUNTER (OUTPATIENT)
Dept: HOSPITAL 62 - RAD | Age: 47
End: 2017-10-31
Attending: INTERNAL MEDICINE
Payer: OTHER GOVERNMENT

## 2017-10-31 DIAGNOSIS — C50.412: Primary | ICD-10-CM

## 2017-10-31 PROCEDURE — 77059: CPT

## 2017-10-31 PROCEDURE — C8906 MRI W/CONT, BREAST,  BI: HCPCS

## 2017-10-31 PROCEDURE — A9576 INJ PROHANCE MULTIPACK: HCPCS

## 2017-11-02 NOTE — RADIOLOGY REPORT (SQ)
EXAM DESCRIPTION:  MRI BREAST BILAT W AND/OR WO



COMPLETED DATE/TIME:  10/31/2017 1:25 pm



REASON FOR STUDY:  BREAST CA (C50.412) C50.412  MALIG NEOPLASM OF UPPER-OUTER QUADRANT OF LEFT FEMAL



COMPARISON:  6/14/2017



PATHOLOGIC CORRELATION:  Left breast carcinoma



CONTRAST TYPE AND DOSE:  20 mL Prohance.



RENAL FUNCTION:  None required. The patient is less than 50 years old.



TECHNIQUE:  MR imaging performed with a dedicated breast coil. Pre contrast T1 and T2 weighted images
.  Pre contrast and post contrast enhanced T1 weighted images with fat saturation.

Subtraction images, 3D thick and thin MIPS, and kinetic analysis performed on an independent workstat
ion. ([employer] workstation)

Magnet strength: 1.5 T



LIMITATIONS:  None.



FINDINGS:  BREAST DENSITY: c. The breasts are heterogeneously dense, which may obscure small masses.

BACKGROUND PARENCHYMAL ENHANCEMENT:Mild.

RIGHT BREAST: No enhancing or suspicious masses.   No clumped, regional/segmental ductal enhancement.


CHEST WALL: Normal tissue planes. No abnormal internal mammary nodes.

AXILLA:  Normal axillary and retro-pectoral nodes.

LEFT BREAST:Generalized increased enhancement throughout the left breast when compared to the right, 
however improved over previous.  No discrete micro nodular enhancement as seen previously.  Persisten
t skin thickening.   Dominant mass which is either in the axillary tail of the breast or left axilla 
now measures 3 cm compared to 5 cm previously.

CHEST WALL:  Normal tissue planes. No abnormal internal mammary nodes.

 AXILLA: Persistent axillary nodes.  The largest is decreased from 3 cm to 1.7 cm.

OTHER:No identified liver, bone, or lung lesions. No other significant incidental findings.



IMPRESSION:  Moderate response to treatment of the left breast.  Persistent dominant axillary node/ma
ss tail of the left breast as well as axillary adenopathy.  Generalize enhancement of the left breast
 and skin thickening, improved over previous.



BIRAD:  RIGHT BREAST: 1 Negative.

LEFT BREAST: 6 Known biopsy-proven malignancy. Appropriate action should be taken.



RECOMMENDATION:  RECOMMENDED FOLLOW-UP: Per protocol.



TECHNICAL DOCUMENTATION:  JOB ID:  [id number]

 2011 [email or web address]- All Rights Reserved

## 2017-11-14 ENCOUNTER — HOSPITAL ENCOUNTER (OUTPATIENT)
Dept: HOSPITAL 62 - OROUT | Age: 47
Setting detail: OBSERVATION
LOS: 2 days | Discharge: HOME | End: 2017-11-16
Attending: SURGERY | Admitting: SURGERY
Payer: OTHER GOVERNMENT

## 2017-11-14 DIAGNOSIS — I49.3: Primary | ICD-10-CM

## 2017-11-14 DIAGNOSIS — Z79.899: ICD-10-CM

## 2017-11-14 DIAGNOSIS — Z87.891: ICD-10-CM

## 2017-11-14 DIAGNOSIS — I49.1: ICD-10-CM

## 2017-11-14 DIAGNOSIS — I45.10: ICD-10-CM

## 2017-11-14 DIAGNOSIS — G47.30: ICD-10-CM

## 2017-11-14 DIAGNOSIS — E66.9: ICD-10-CM

## 2017-11-14 DIAGNOSIS — C50.912: ICD-10-CM

## 2017-11-14 DIAGNOSIS — C79.89: ICD-10-CM

## 2017-11-14 DIAGNOSIS — K80.10: ICD-10-CM

## 2017-11-14 DIAGNOSIS — E03.9: ICD-10-CM

## 2017-11-14 DIAGNOSIS — Z82.49: ICD-10-CM

## 2017-11-14 LAB
ALBUMIN SERPL-MCNC: 4.4 G/DL (ref 3.5–5)
ALP SERPL-CCNC: 52 U/L (ref 38–126)
ALT SERPL-CCNC: 33 U/L (ref 9–52)
ANION GAP SERPL CALC-SCNC: 11 MMOL/L (ref 5–19)
ANION GAP SERPL CALC-SCNC: 11 MMOL/L (ref 5–19)
ANION GAP SERPL CALC-SCNC: 12 MMOL/L (ref 5–19)
AST SERPL-CCNC: 24 U/L (ref 14–36)
BASOPHILS # BLD AUTO: 0 10^3/UL (ref 0–0.2)
BASOPHILS NFR BLD AUTO: 1.4 % (ref 0–2)
BILIRUB DIRECT SERPL-MCNC: 0.3 MG/DL (ref 0–0.4)
BILIRUB SERPL-MCNC: 0.5 MG/DL (ref 0.2–1.3)
BUN SERPL-MCNC: 8 MG/DL (ref 7–20)
BUN SERPL-MCNC: 8 MG/DL (ref 7–20)
CALCIUM: 9.4 MG/DL (ref 8.4–10.2)
CALCIUM: 9.5 MG/DL (ref 8.4–10.2)
CHLORIDE SERPL-SCNC: 106 MMOL/L (ref 98–107)
CHOLEST SERPL-MCNC: 162.03 MG/DL (ref 0–200)
CO2 SERPL-SCNC: 29 MMOL/L (ref 22–30)
CO2 SERPL-SCNC: 29 MMOL/L (ref 22–30)
CO2 SERPL-SCNC: 30 MMOL/L (ref 22–30)
CREAT SERPL-MCNC: 0.54 MG/DL (ref 0.52–1.25)
CREAT SERPL-MCNC: 0.62 MG/DL (ref 0.52–1.25)
DIRECT HDL: 76 MG/DL (ref 40–?)
EOSINOPHIL # BLD AUTO: 0 10^3/UL (ref 0–0.6)
EOSINOPHIL NFR BLD AUTO: 1.4 % (ref 0–6)
ERYTHROCYTE [DISTWIDTH] IN BLOOD BY AUTOMATED COUNT: 15.6 % (ref 11.5–14)
GLUCOSE SERPL-MCNC: 77 MG/DL (ref 75–110)
GLUCOSE SERPL-MCNC: 82 MG/DL (ref 75–110)
HCT VFR BLD CALC: 34.3 % (ref 36–47)
HGB BLD-MCNC: 11.1 G/DL (ref 12–15.5)
HGB HCT DIFFERENCE: -1
LDLC SERPL DIRECT ASSAY-MCNC: 72 MG/DL (ref ?–100)
LYMPHOCYTES # BLD AUTO: 0.6 10^3/UL (ref 0.5–4.7)
LYMPHOCYTES NFR BLD AUTO: 20.9 % (ref 13–45)
MAGNESIUM SERPL-MCNC: 1.7 MG/DL (ref 1.6–2.3)
MAGNESIUM SERPL-MCNC: 1.7 MG/DL (ref 1.6–2.3)
MAGNESIUM SERPL-MCNC: 1.9 MG/DL (ref 1.6–2.3)
MCH RBC QN AUTO: 27.7 PG (ref 27–33.4)
MCHC RBC AUTO-ENTMCNC: 32.4 G/DL (ref 32–36)
MCV RBC AUTO: 86 FL (ref 80–97)
MONOCYTES # BLD AUTO: 0.3 10^3/UL (ref 0.1–1.4)
MONOCYTES NFR BLD AUTO: 9.2 % (ref 3–13)
NEUTROPHILS # BLD AUTO: 1.8 10^3/UL (ref 1.7–8.2)
NEUTS SEG NFR BLD AUTO: 67.1 % (ref 42–78)
PHOSPHATE SERPL-MCNC: 5.2 MG/DL (ref 2.5–4.5)
POTASSIUM SERPL-SCNC: 3.9 MMOL/L (ref 3.6–5)
POTASSIUM SERPL-SCNC: 3.9 MMOL/L (ref 3.6–5)
POTASSIUM SERPL-SCNC: 4 MMOL/L (ref 3.6–5)
PROT SERPL-MCNC: 6.9 G/DL (ref 6.3–8.2)
RBC # BLD AUTO: 4 10^6/UL (ref 3.72–5.28)
SODIUM SERPL-SCNC: 145.7 MMOL/L (ref 137–145)
SODIUM SERPL-SCNC: 146.9 MMOL/L (ref 137–145)
SODIUM SERPL-SCNC: 147.2 MMOL/L (ref 137–145)
TRIGL SERPL-MCNC: 59 MG/DL (ref ?–150)
VLDLC SERPL CALC-MCNC: 12 MG/DL (ref 10–31)
WBC # BLD AUTO: 2.7 10^3/UL (ref 4–10.5)

## 2017-11-14 PROCEDURE — 93005 ELECTROCARDIOGRAM TRACING: CPT

## 2017-11-14 PROCEDURE — 84439 ASSAY OF FREE THYROXINE: CPT

## 2017-11-14 PROCEDURE — 83880 ASSAY OF NATRIURETIC PEPTIDE: CPT

## 2017-11-14 PROCEDURE — 84484 ASSAY OF TROPONIN QUANT: CPT

## 2017-11-14 PROCEDURE — 85025 COMPLETE CBC W/AUTO DIFF WBC: CPT

## 2017-11-14 PROCEDURE — 71010: CPT

## 2017-11-14 PROCEDURE — 47562 LAPAROSCOPIC CHOLECYSTECTOMY: CPT

## 2017-11-14 PROCEDURE — 80048 BASIC METABOLIC PNL TOTAL CA: CPT

## 2017-11-14 PROCEDURE — 36415 COLL VENOUS BLD VENIPUNCTURE: CPT

## 2017-11-14 PROCEDURE — 80061 LIPID PANEL: CPT

## 2017-11-14 PROCEDURE — 88304 TISSUE EXAM BY PATHOLOGIST: CPT

## 2017-11-14 PROCEDURE — 81025 URINE PREGNANCY TEST: CPT

## 2017-11-14 PROCEDURE — 83735 ASSAY OF MAGNESIUM: CPT

## 2017-11-14 PROCEDURE — 84443 ASSAY THYROID STIM HORMONE: CPT

## 2017-11-14 PROCEDURE — 84481 FREE ASSAY (FT-3): CPT

## 2017-11-14 PROCEDURE — 80053 COMPREHEN METABOLIC PANEL: CPT

## 2017-11-14 PROCEDURE — 93010 ELECTROCARDIOGRAM REPORT: CPT

## 2017-11-14 PROCEDURE — 80051 ELECTROLYTE PANEL: CPT

## 2017-11-14 PROCEDURE — 93306 TTE W/DOPPLER COMPLETE: CPT

## 2017-11-14 PROCEDURE — 84100 ASSAY OF PHOSPHORUS: CPT

## 2017-11-14 PROCEDURE — G0378 HOSPITAL OBSERVATION PER HR: HCPCS

## 2017-11-14 RX ADMIN — METOPROLOL SUCCINATE ONE MG: 25 TABLET, EXTENDED RELEASE ORAL at 22:13

## 2017-11-14 RX ADMIN — CEFAZOLIN SODIUM PRN ML: 1 SOLUTION INTRAVENOUS at 21:56

## 2017-11-14 RX ADMIN — CEFAZOLIN SODIUM PRN ML: 1 SOLUTION INTRAVENOUS at 21:55

## 2017-11-14 RX ADMIN — SERTRALINE HYDROCHLORIDE SCH MG: 50 TABLET ORAL at 22:14

## 2017-11-14 RX ADMIN — CEFAZOLIN SODIUM PRN ML: 1 SOLUTION INTRAVENOUS at 22:01

## 2017-11-14 RX ADMIN — CEFAZOLIN SODIUM PRN ML: 1 SOLUTION INTRAVENOUS at 21:57

## 2017-11-14 RX ADMIN — SODIUM CHLORIDE PRN ML: 9 INJECTION, SOLUTION INTRAVENOUS at 22:03

## 2017-11-14 RX ADMIN — CEFAZOLIN SODIUM PRN ML: 1 SOLUTION INTRAVENOUS at 21:59

## 2017-11-14 RX ADMIN — CEFAZOLIN SODIUM PRN ML: 1 SOLUTION INTRAVENOUS at 22:00

## 2017-11-14 RX ADMIN — SPIRONOLACTONE ONE MG: 25 TABLET, FILM COATED ORAL at 22:12

## 2017-11-14 RX ADMIN — CEFAZOLIN SODIUM PRN ML: 1 SOLUTION INTRAVENOUS at 21:53

## 2017-11-14 RX ADMIN — Medication SCH ML: at 21:56

## 2017-11-14 RX ADMIN — METOPROLOL SUCCINATE ONE MG: 25 TABLET, EXTENDED RELEASE ORAL at 19:56

## 2017-11-14 RX ADMIN — SPIRONOLACTONE ONE MG: 25 TABLET, FILM COATED ORAL at 19:56

## 2017-11-14 NOTE — RADIOLOGY REPORT (SQ)
EXAM DESCRIPTION:  CHEST SINGLE VIEW



COMPLETED DATE/TIME:  11/14/2017 7:52 pm



REASON FOR STUDY:  cardiac dysrhymia, cardiomyopathy K80.80  OTHER CHOLELITHIASIS WITHOUT OBSTRUCTION




COMPARISON:  None.



NUMBER OF VIEWS:  One view.



TECHNIQUE:  Single frontal radiographic view of the chest acquired.



LIMITATIONS:  None.



FINDINGS:  LUNGS AND PLEURA: No opacities, masses or pneumothorax.  No pleural effusion.

MEDIASTINUM AND HILAR STRUCTURES: No masses.  Contour normal.

HEART AND VASCULAR STRUCTURES: Heart enlarged without failure.  Normal vasculature.

BONES: No acute findings.

HARDWARE: Venous access catheter.

OTHER: No other significant finding.



IMPRESSION:  HEART ENLARGED WITHOUT FAILURE.  NO OTHER SIGNIFICANT RADIOGRAPHIC FINDING IN THE CHEST.




TECHNICAL DOCUMENTATION:  JOB ID:  [id number]

 2011 [employer]- All Rights Reserved

## 2017-11-14 NOTE — XCELERA REPORT
[address] [address]

                             Tel: [phone]

                             Fax: [phone]



                    Transthoracic Echocardiogram Report

____________________________________________________________________________



Name: RUSS ANTOINE

MRN: [id number]                Age: 47 yrs

Gender: Female                 : 1970

Patient Status: Outpatient     Patient Location: [address] [place] [address]

Account #: [id number]

Study Date: 2017 08:48 PM

Accession #: [id number]

____________________________________________________________________________



Height: 65 in        Weight: 239 lb        BSA: 2.1 m2



____________________________________________________________________________

Procedure: A complete two-dimensional transthoracic echocardiogram was

performed (2D, M-mode, spectral and color flow Doppler). The study was

technically difficult with many images being suboptimal in quality.

Reason For Study: cardiac dysrhymia, cardiomyopathy





Ordering Physician: ELGIN LEMON

Performed By: Taryn Harrington

____________________________________________________________________________





Interpretation Summary

The left ventricular ejection fraction is normal.

There is mild concentric left ventricular hypertrophy.

The left ventricle is grossly normal size.

Doppler measurements suggest pseudonormalized left ventricular relaxation,

which is associated with grade II/IV or mild to moderate diastolic

dysfunction

Wall motion cannot be accurately commented on, but no definite regional

wall motion abnormalities noted.

The right ventricle is borderline dilated.

The right ventricular systolic function is normal.

The right atrium is normal in size

The left atrial size is normal.

There is a trace amount of mitral regurgitation

There is no mitral valve stenosis.

No aortic regurgitation is present.

There is no aortic valve stenosis

There is a trace or physiologic amount of tricuspid regurgitation

Tricuspid regurgitation jet envelope not well defined to measure RV

systolic pressure accurately.

The aortic root is not well visualized.

The inferior vena cava was not well visualized

There is no pericardial effusion.



____________________________________________________________________________



MMode/2D Measurements & Calculations

RVDd: 2.7 cm       LVIDd: 5.2 cmFS: 33.6 %           Ao root diam: 3.3 cm

IVSd: 1.2 cm       LVIDs: 3.5 cmEDV(Teich): 130.2 ml

                   LVPWd: 1.2 cmESV(Teich): 49.6 ml  Ao root area: 8.6 cm2

                                EF(Teich): 61.9 %    LA dimension: 3.1 cm



        _____________________________________________________________

LVOT diam: 2.5 cm

LVOT area: 5.1 cm2





Doppler Measurements & Calculations

MV E max sharri:      MV P1/2t max sharri:    Ao V2 max:        LV V1 max P.3 cm/sec        48.0 cm/sec          125.5 cm/sec      2.9 mmHg

MV A max sharri:      MV P1/2t: 71.1 msec  Ao max PG:        LV V1 max:

62.7 cm/sec        MVA(P1/2t): 3.1 cm2  6.3 mmHg          85.8 cm/sec

MV E/A: 0.77       MV dec slope:        RAMANDEEP(V,D): 3.5 cm2

                   197.6 cm/sec2



        _____________________________________________________________

PA V2 max:         TR max sharri:

73.5 cm/sec        194.9 cm/sec

PA max PG:         TR max PG: 15.2 mmHg

2.2 mmHg



____________________________________________________________________________

Left Ventricle

The left ventricle is grossly normal size. There is mild concentric left

ventricular hypertrophy. The left ventricular ejection fraction is normal.

Doppler measurements suggest pseudonormalized left ventricular relaxation,

which is associated with grade II/IV or mild to moderate diastolic

dysfunction. Wall motion cannot be accurately commented on, but no definite

regional wall motion abnormalities noted.



Right Ventricle

The right ventricle is borderline dilated. There is normal right

ventricular wall thickness. The right ventricular systolic function is

normal.



Atria

The right atrium is normal in size. The left atrial size is normal.

Interarterial septum not well visualized and not well dopplered. Cannot

comment on ASD/PFO presence.





Mitral Valve

The mitral valve is grossly normal. There is no mitral valve stenosis.

There is a trace amount of mitral regurgitation.



Aortic Valve

The aortic valve is grossly normal. There is no aortic valve stenosis. No

aortic regurgitation is present.



Tricuspid Valve

The tricuspid valve is not well visualized secondary to technical

limitations. There is no tricuspid stenosis. There is a trace or

physiologic amount of tricuspid regurgitation. Tricuspid regurgitation jet

envelope not well defined to measure RV systolic pressure accurately.



Pulmonic Valve

The pulmonic valve is not well visualized.



Great Vessels

The aortic root is not well visualized. The inferior vena cava was not well

visualized.





Effusions

There is no pericardial effusion.



____________________________________________________________________________



Electronically signed by:      Elgin Lemon      on 2017 10:00 PM



CC: ELGIN LEMON

>

Elgin Lemon Detail Level: Detailed Provider Name, If Known (E.G., Dr. ORELLANA): Dr. Chavez

## 2017-11-14 NOTE — PDOC CONSULTATION
Consultation


Consult Date: 11/14/17


Attending physician:: MARY PHILIP


Consult reason:: Cardiac dysrhythmia





History of Present Illness


Admission Date/PCP: 


  





  VERONICA THOMSON MD





Patient complains of: No complaint.  Patient was noted to have cardiac 

dysrhythmia in the OR


History of Present Illness: 


RUSS ANTOINE is a 47 year old female with breast cancer pending bilateral 

mastectomy in a couple of weeks.  Patient was noted with right upper quadrant 

abdominal pain and subsequently underwent an ultrasound which demonstrated 

gallstones.  She was scheduled for a laparoscopic cholecystectomy today however 

upon taking her to the operating room she was noted to be in an A. fib with 

rapid ventricular rate.  With heart rate of 110.  Patient has had no symptoms.  

No shortness of breath and no chest pain.  She does not have any known cardiac 

history in the past although she notes that with her last orthopedic surgery 

about a year ago she has some cardiac irregularity.  Patient is now being 

admitted for workup of her atrial fib.  Her laparoscopic cholecystectomy has 

been postponed.


I saw the patient in the PACU unit.  Available EKG strips and EKG shows that 

patient in sinus rhythm but with frequent APCs and VPCs.  With the patient ever 

went into A. fib is not known or cannot be proven.  However it is clear that 

patient would need to be monitored.  Patient was also noted to have an abnormal 

EKG with a diffusely low voltage QRS and right bundle branch block pattern.


On questioning patient admits to having been diagnosed to have sleep apnea but 

never been on CPAP therapy.  Patient also describes previous cardiac 

irregularity.  Patient claims that she has completed a course of chemotherapy 

for breast cancer.








Past Medical History


Cardiac Medical History: 


   Denies: Coronary Artery Disease, Myocardial Infarction, Hypertension


Pulmonary Medical History: Reports: Sleep Apnea


   Denies: Asthma, Bronchitis, Chronic Obstructive Pulmonary Disease (COPD), 

Pneumonia


Neurological Medical History: 


   Denies: Seizures


Endocrine Medical History: Reports: Hypothyroidism


Malignancy Medical History: Reports: Breast Cancer - left breast cancer 

metastatic to the axilla status post neoadjuvant therapy


GI Medical History: Reports: Other - Gallstones


Musculoskeltal Medical History: Reports: Arthritis - hands and knees 


Psychiatric Medical History: Reports: Depression


Hematology: Reports: Anemia - with period





Past Surgical History


Past Surgical History: Reports: Orthopedic Surgery - R SHOULDER REPLACEMENT, 

Thyroidectomy, Tubal Ligation





Social History


Information Source: Patient


Smoking Status: Former Smoker





- Advance Directive


Resuscitation Status: Full Code


Surrogate healthcare decision maker:: 





Patient's 





Family History


Family History: Hypertension


Parental Family History Reviewed: Yes


Children Family History Reviewed: Yes


Sibling(s) Family History Reviewed.: Yes - Negative for premature coronary 

artery disease or sudden cardiac death in the family amongst first degree 

relatives.





Medication/Allergy


Home Medications: 








Levothyroxine Sodium [Synthroid] 200 mcg PO DAILY 05/26/16 


Sertraline HCl [Zoloft 50 mg Tablet] 1 tab PO QHS 05/26/16 


Oxycodone HCl/Acetaminophen [Percocet 5-325 mg Tablet] 1 tab PO ASDIR PRN #25 

tablet 06/20/17 


Alprazolam [Xanax] 0.5 mg PO ASDIR PRN 11/13/17 


Gabapentin 300 mg PO TID 11/13/17 


Vitamin B Complex 1 each PO ASDIR PRN 11/13/17 


Ibuprofen 1 tab PO Q6 PRN 11/14/17 








Allergies/Adverse Reactions: 


 





hydrocodone Adverse Reaction (Verified 11/14/17 15:31)


 











Review of Systems


Review of Systems: 





Please see history of present illness and past medical history as wall.


Constitutional: No fever or chills reported.


Head : No recent chronic headaches, recent head injury.


Eyes: No recent eye pain, diplopia, redness, discharge, acute visual changes.


Ears: No recent chronic ear pain, acute hearing loss, ear discharge.


Oral cavity: No recent  ulcerations, bleeding, oral cavity discomfort.


Neck: No recent acute neck pain reported.


Hematologic: No recent easy bruising or bleeding or hematologic malignancy 

reported.


Lymphatic: No recent lymphatic malignancy, chronic lymphadenopathy reported yet


Cardiovascular system review: See history of present illness.


Respiratory system review: No recent chronic cough, hemoptysis, blood clots in 

the lungs reported. Mild Shortness of breath on exertion.  Patient has noted 

occasional pedal edema.


Gastrointestinal system review: Negative for any recent acute or chronic 

abdominal pain, hematemesis, melena, recent change in bowel habits.  


Genitourinary system review: No recent acute or chronic hematuria, flank pain, 

UTI etc. reported.


Skin system review: Negative for any recent abnormal bruising, no rash, no 

pruritus reported.


Neurologic: No prior history of strokes, mini strokes, seizure disorder.


Psychologic: No history of major psychosis or major depression reported.


Musculoskeletal: Minor aches and pains reported.  No acute joint swelling 

reported.


Endocrine: No recent polyuria, polydipsia, recent heat or cold intolerance.





Physical Exam


Vital Signs: 


 











Temp Pulse Resp BP Pulse Ox


 


 98.4 F   97   16   135/81 H  100 


 


 11/14/17 14:45  11/14/17 14:45  11/14/17 14:45  11/14/17 14:45  11/14/17 14:45








 Intake & Output











 11/13/17 11/14/17 11/15/17





 06:59 06:59 06:59


 


Intake Total   1000


 


Output Total   400


 


Balance   600


 


Weight  108.86 kg 108.86 kg











Exam: 








GENERAL: well-nourished and in no acute distress.  Alert and oriented x3


HEAD: Atraumatic, normocephalic.


EYES: Pupils equal round and reactive to light, extraocular movements intact, 

sclera anicteric, conjunctiva are normal.


ENT: TMs normal, nares patent, oropharynx clear without exudates. Moist mucous 

membranes.  No oral ulcerations or bleeding gums noted


NECK: supple without lymphadenopathy.  Trachea is central.  No cervical or 

axillary lymphadenopathy noted.  Carotids are 2+, JVD WNL 


LUNGS: Respiration seems nonlabored, no significant accessory muscle action 

noted.  Breath sounds clear to auscultation bilaterally and equal noted. No 

wheezes rales or rhonchi noted.  No significant dullness noted on percussion.


CHEST: Palpation of the chest wall shows no significant chest wall tenderness.  

No other significant abnormalities noted.


HEART: [place] NP, No PSH,  1/6 MUNDO aortic area, 1/6 pan systolic murmur mitral 

area, no rubs, no gallops.


ABDOMEN: Soft, no significant tenderness appreciated, normoactive bowel sounds. 

No guarding, no rebound.  No rigidity noted . No masses appreciated.


EXTREMITIES: Pedal pulses are 1-2+, no calf tenderness noted. No clubbing or 

cyanosis.trace to 1+ pedal edema noted


NEUROLOGICAL: Focused neurological exam showed no significant neurologic 

deficit. Normal speech, no focal weakness appreciated. 


PSYCH: Normal mood, normal affect.  Judgment and insight within normal limits.


SKIN: No significant ecchymosis, rash, ulcerations or signs of pruritus noted.


MUSCULOSKELETAL EXAM: No significant joint swelling noted.





Results


Laboratory Results: 


 





 11/14/17 15:59 





 11/14/17 20:02 





 











  11/14/17 11/14/17 11/14/17





  15:59 15:59 15:59


 


WBC  2.7 L  


 


RBC  4.00  


 


Hgb  11.1 L  


 


Hct  34.3 L  


 


MCV  86  


 


MCH  27.7  


 


MCHC  32.4  


 


RDW  15.6 H  


 


Plt Count  203  


 


Seg Neutrophils %  67.1  


 


Lymphocytes %  20.9  


 


Monocytes %  9.2  


 


Eosinophils %  1.4  


 


Basophils %  1.4  


 


Absolute Neutrophils  1.8  


 


Absolute Lymphocytes  0.6  


 


Absolute Monocytes  0.3  


 


Absolute Eosinophils  0.0  


 


Absolute Basophils  0.0  


 


Sodium   146.9 H 


 


Potassium   3.9 


 


Chloride   106 


 


Carbon Dioxide   30 


 


Anion Gap   11 


 


BUN   8 


 


Creatinine   0.62 


 


Est GFR ( Amer)   > 60 


 


Est GFR (Non-Af Amer)   > 60 


 


Glucose   82 


 


Calcium   9.5 


 


Phosphorus    5.2 H


 


Magnesium    1.9


 


Total Bilirubin   0.5 


 


AST   24 


 


ALT   33 


 


Alkaline Phosphatase   52 


 


Total Protein   6.9 


 


Albumin   4.4 














  11/14/17 11/14/17





  15:59 20:02


 


WBC  


 


RBC  


 


Hgb  


 


Hct  


 


MCV  


 


MCH  


 


MCHC  


 


RDW  


 


Plt Count  


 


Seg Neutrophils %  


 


Lymphocytes %  


 


Monocytes %  


 


Eosinophils %  


 


Basophils %  


 


Absolute Neutrophils  


 


Absolute Lymphocytes  


 


Absolute Monocytes  


 


Absolute Eosinophils  


 


Absolute Basophils  


 


Sodium  147.2 H  145.7 H


 


Potassium  4.0  3.9


 


Chloride  106  106


 


Carbon Dioxide  29  29


 


Anion Gap  12  11


 


BUN   8


 


Creatinine   0.54


 


Est GFR ( Amer)   > 60


 


Est GFR (Non-Af Amer)   > 60


 


Glucose   77


 


Calcium   9.4


 


Phosphorus  


 


Magnesium   1.7


 


Total Bilirubin  


 


AST  


 


ALT  


 


Alkaline Phosphatase  


 


Total Protein  


 


Albumin  








 











  11/14/17 11/14/17





  19:30 20:02


 


Troponin I  < 0.012 


 


NT-Pro-B Natriuret Pep   167 H











EKG Comments: 





Multiple EKGs and rhythm strips reviewed.  It showed frequent APCs, VPCs, sinus 

rhythm, low voltage QRS and right bundle branch block pattern.


Impressions: 


 





Chest X-Ray  11/14/17 00:00


IMPRESSION:  HEART ENLARGED WITHOUT FAILURE.  NO OTHER SIGNIFICANT RADIOGRAPHIC 

FINDING IN THE CHEST.


 














Assessment & Plan





- Diagnosis


(1) Abnormal EKG


Is this a current diagnosis for this admission?: Yes   





(2) Cardiac dysrhythmia, unspecified


Qualifiers: 


   Arrhythmia type: other cardiac arrhythmia   Qualified Code(s): I49.8 - Other 

specified cardiac arrhythmias   


Is this a current diagnosis for this admission?: Yes   





(3) Sleep apnea syndrome


Qualifiers: 


   Sleep apnea type: unspecified type   Qualified Code(s): G47.30 - Sleep apnea

, unspecified   


Is this a current diagnosis for this admission?: Yes   





(4) Obesity


Qualifiers: 


   Obesity type: unspecified obesity type 


Is this a current diagnosis for this admission?: Yes   





(5) Gallstones and inflammation of gallbladder without obstruction


Is this a current diagnosis for this admission?: Yes   





- Notes


Notes: 





Preop cardiovascular evaluation: Patient certainly has an abnormal EKG with low 

QRS voltage but she has been relatively asymptomatic without any chest pain or 

any significant dyspnea.  Patient was noted to have some cardiac dysrhythmia.  

At this point will start patient on metoprolol succinate.  Will check lipid 

panel and also order a 2D echo.


Cardiac dysrhythmia: Patient noted to have frequent ventricular ectopy.  Will 

check electrolytes and magnesium level.  Have started patient on beta-blocker 

therapy.


Sleep apnea syndrome: Sleep apnea syndrome can cause cardiac dysrhythmia.  This 

will be evaluated as an outpatient.


Obesity: To be evaluated as an outpatient.


Gallstones with cholecystitis.  Patient was supposed to undergo gallbladder 

surgery but this was postponed.  Hopefully patient could have surgery tomorrow.


Breast cancer: I am told by the surgeon that breast cancer surgery is fairly 

urgent and needs to be performed within the next 2-3 weeks.  Will try optimize 

medical management for this patient.





- Time


Time Spent: 30 to 50 Minutes - CODE STATUS was discussed, patient remains full 

code.  Surrogate decision-maker patient's .  Multiple medical problems 

were addressed.  More than 50% of the time spent coordinating care, discussing 

management plans with involved caregivers.  Management plans discussed with 

involved personnels.  Medical decision making was of moderate to high complexity

, patient's has multiple  comorbidities.


Medications reviewed and adjusted accordingly: Yes

## 2017-11-14 NOTE — PDOC H&P
History of Present Illness


Admission Date/PCP: 


  





  VERONICA THOMSON MD





Patient complains of: New onset A. fib


History of Present Illness: 


RUSS ANTOINE is a 47 year old female with breast cancer pending bilateral 

mastectomy in a couple of weeks.  Patient was noted with right upper quadrant 

abdominal pain and subsequently underwent an ultrasound which demonstrated 

gallstones.  She was scheduled for a laparoscopic cholecystectomy today however 

upon taking her to the operating room she was noted to be in an A. fib with 

rapid ventricular rate.  With heart rate of 110.  Patient has had no symptoms.  

No shortness of breath and no chest pain.  She does not have any known cardiac 

history in the past although she notes that with her last orthopedic surgery 

about a year ago she has some cardiac irregularity.  Patient is now being 

admitted for workup of her atrial fib.  Her laparoscopic cholecystectomy has 

been postponed.








Past Medical History


Cardiac Medical History: 


   Denies: Coronary Artery Disease, Myocardial Infarction, Hypertension


Pulmonary Medical History: 


   Denies: Asthma, Bronchitis, Chronic Obstructive Pulmonary Disease (COPD), 

Pneumonia


Neurological Medical History: 


   Denies: Seizures


Endocrine Medical History: Reports: Hypothyroidism


Malignancy Medical History: Reports: Breast Cancer - left breast cancer 

metastatic to the axilla status post neoadjuvant therapy


GI Medical History: Reports: Other - Gallstones


Musculoskeltal Medical History: Reports: Arthritis - hands and knees 


Psychiatric Medical History: Reports: Depression


Hematology: Reports: Anemia - with period





Past Surgical History


Past Surgical History: Reports: Orthopedic Surgery - R SHOULDER REPLACEMENT, 

Thyroidectomy, Tubal Ligation





Social History


Smoking Status: Former Smoker





Family History


Family History: Reviewed & Not Pertinent


Parental Family History Reviewed: Yes


Children Family History Reviewed: Yes


Sibling(s) Family History Reviewed.: Yes





Medication/Allergy


Home Medications: 








Levothyroxine Sodium [Synthroid] 200 mcg PO DAILY 05/26/16 


Sertraline HCl [Zoloft 50 mg Tablet] 1 tab PO QHS 05/26/16 


Oxycodone HCl/Acetaminophen [Percocet 5-325 mg Tablet] 1 tab PO ASDIR PRN #25 

tablet 06/20/17 


Alprazolam [Xanax] 0.5 mg PO ASDIR PRN 11/13/17 


Gabapentin 300 mg PO TID 11/13/17 


Vitamin B Complex 1 each PO ASDIR PRN 11/13/17 


Ibuprofen 1 tab PO Q6 PRN 11/14/17 








Allergies/Adverse Reactions: 


 





hydrocodone Adverse Reaction (Verified 11/14/17 15:31)


 











Physical Exam


Vital Signs: 


 











Temp Pulse Resp BP Pulse Ox


 


 98.4 F   97   16   135/81 H  100 


 


 11/14/17 14:45  11/14/17 14:45  11/14/17 14:45  11/14/17 14:45  11/14/17 14:45








 Intake & Output











 11/13/17 11/14/17 11/15/17





 06:59 06:59 06:59


 


Intake Total   1000


 


Output Total   400


 


Balance   600


 


Weight  108.86 kg 108.86 kg











General appearance: PRESENT: no acute distress, cooperative


Eye exam: PRESENT: conjunctiva pink


Neck exam: PRESENT: other - Supple


Respiratory exam: PRESENT: clear to auscultation alejandro


Cardiovascular exam: PRESENT: irregular rhythm


GI/Abdominal exam: PRESENT: other - Soft, nondistended, nontender to palpation.


Extremities exam: PRESENT: other - No swelling.


Neurological exam: PRESENT: alert, awake


Psychiatric exam: PRESENT: appropriate affect


Skin exam: PRESENT: warm





Results


Laboratory Results: 


 





 11/14/17 15:59 





 11/14/17 15:59 





 











  11/14/17 11/14/17





  15:59 15:59


 


WBC  2.7 L 


 


RBC  4.00 


 


Hgb  11.1 L 


 


Hct  34.3 L 


 


MCV  86 


 


MCH  27.7 


 


MCHC  32.4 


 


RDW  15.6 H 


 


Plt Count  203 


 


Seg Neutrophils %  67.1 


 


Lymphocytes %  20.9 


 


Monocytes %  9.2 


 


Eosinophils %  1.4 


 


Basophils %  1.4 


 


Absolute Neutrophils  1.8 


 


Absolute Lymphocytes  0.6 


 


Absolute Monocytes  0.3 


 


Absolute Eosinophils  0.0 


 


Absolute Basophils  0.0 


 


Sodium   146.9 H


 


Potassium   3.9


 


Chloride   106


 


Carbon Dioxide   30


 


Anion Gap   11


 


BUN   8


 


Creatinine   0.62


 


Est GFR ( Amer)   > 60


 


Est GFR (Non-Af Amer)   > 60


 


Glucose   82


 


Calcium   9.5


 


Total Bilirubin   0.5


 


AST   24


 


ALT   33


 


Alkaline Phosphatase   52


 


Total Protein   6.9


 


Albumin   4.4














Assessment & Plan





- Diagnosis


(1) Atrial fibrillation with RVR


Is this a current diagnosis for this admission?: Yes   


Plan: 


We will admit the patient to telemetry floor.  Will obtain cardiology 

consultation.  Will obtain an EKG and troponin and remainder of her 

chemistries.  We will cancel her laparoscopic cholecystectomy for now.








(2) Gallstones


Is this a current diagnosis for this admission?: Yes   


Plan: 


We will hold off her laparoscopic cholecystectomy in 2 week get a better handle 

on her cardiac status.

## 2017-11-15 LAB — T3FREE SERPL-MCNC: 4.17 PG/ML (ref 2.77–5.27)

## 2017-11-15 PROCEDURE — 0FT44ZZ RESECTION OF GALLBLADDER, PERCUTANEOUS ENDOSCOPIC APPROACH: ICD-10-PCS | Performed by: SURGERY

## 2017-11-15 RX ADMIN — SODIUM CHLORIDE PRN ML: 9 INJECTION, SOLUTION INTRAVENOUS at 22:11

## 2017-11-15 RX ADMIN — METOPROLOL SUCCINATE SCH MG: 25 TABLET, EXTENDED RELEASE ORAL at 10:59

## 2017-11-15 RX ADMIN — METOPROLOL SUCCINATE SCH MG: 25 TABLET, EXTENDED RELEASE ORAL at 22:06

## 2017-11-15 RX ADMIN — SERTRALINE HYDROCHLORIDE SCH MG: 50 TABLET ORAL at 22:06

## 2017-11-15 RX ADMIN — GABAPENTIN SCH MG: 100 CAPSULE ORAL at 22:05

## 2017-11-15 RX ADMIN — Medication SCH ML: at 21:57

## 2017-11-15 RX ADMIN — OXYCODONE AND ACETAMINOPHEN PRN TAB: 5; 325 TABLET ORAL at 23:14

## 2017-11-15 RX ADMIN — SPIRONOLACTONE SCH MG: 25 TABLET, FILM COATED ORAL at 10:59

## 2017-11-15 RX ADMIN — ENOXAPARIN SODIUM SCH MG: 40 INJECTION SUBCUTANEOUS at 11:00

## 2017-11-15 RX ADMIN — SODIUM CHLORIDE PRN ML: 9 INJECTION, SOLUTION INTRAVENOUS at 08:52

## 2017-11-15 RX ADMIN — Medication SCH ML: at 05:44

## 2017-11-15 RX ADMIN — Medication SCH ML: at 15:03

## 2017-11-15 NOTE — OPERATIVE REPORT
Operative Report


DATE OF SURGERY: 11/15/17


PREOPERATIVE DIAGNOSIS: Symptomatic cholelithiasis


POSTOPERATIVE DIAGNOSIS: Symptomatic cholelithiasis


OPERATION: Laparoscopic cholecystectomy


SURGEON: MARY PHILIP


ANESTHESIA: GA


TISSUE REMOVED OR ALTERED: gallbladder


COMPLICATIONS: 





None


ESTIMATED BLOOD LOSS: Minimal


INTRAOPERATIVE FINDINGS: Multiple gallstones


PROCEDURE: 





Informed consent was obtained.  Patient was brought to the operating room 

placed operating table in supine position.  After satisfactory induction of 

general anesthesia, patient's abdomen was prepped and draped in usual sterile 

fashion.  A supraumbilical midline incision was made and dissection carried 

down to the fascia the peritoneal cavity entered without difficulty.  Price 

trocar was inserted.  Pneumoperitoneum produced good patient toleration.  5 mm 

trocar was placed in the subxiphoid location.Two 5 mm trochars were placed in 

the right subcostal location.  The gallbladder was encased with omentum which 

was peeled off taking great care to avoid injury to the underlying transverse 

colon and duodenum.  The liver appeared normal.  The anterior abdominal wall 

appeared normal.  The gallbladder was grasped and retracted cephalad over the 

dome of the liver.  The infundibulum of the gallbladder was grasped retracted 

laterally and inferiorly thus exposing calot's  triangle.  The cystic duct 

gallbladder junction was clearly identified  and the cystic duct was clipped 

and divided. Cystic artery was likewise taken.  There was a posterior branch of 

the cystic artery which was also clipped and divided.  The gallbladder was 

taken off the gallbladder bed using the hook electrocautery technique.  The 

gallbladder was removed  with an  Endobag through the Price trocar site 

fascial defect.   Hemostasis appeared excellent.  All trochars were removed 

under the direct vision a laparoscope to ensure hemostasis.  The Price trocar 

site fascial defect was closed with interrupted Vicryl sutures.  All skin 

incisions were closed with subcuticular interrupted Monocryl sutures.  Marcaine 

was injected at the port sites.  Patient tolerated procedure well no apparent 

complications and was taken to the recovery area in stable condition.

## 2017-11-15 NOTE — EKG REPORT
SEVERITY:- ABNORMAL ECG -

SINUS RHYTHM

PROBABLE LEFT ATRIAL ABNORMALITY

LEFT AXIS DEVIATION

ANTEROLATERAL , INFERIOR INFARCT, AGE INDETERMINATE

:

Confirmed by: Elgin Valverde 15-Nov-2017 09:30:44

## 2017-11-15 NOTE — PDOC PROGRESS REPORT
Subjective


Progress Note for:: 11/15/17


Subjective:: 





Feels well.  No complaints





Physical Exam


Vital Signs: 


 











Temp Pulse Resp BP Pulse Ox


 


 98.5 F   67   18   119/62   97 


 


 11/15/17 12:50  11/15/17 12:50  11/15/17 12:50  11/15/17 12:50  11/15/17 12:50








 Intake & Output











 11/14/17 11/15/17 11/16/17





 06:59 06:59 06:59


 


Intake Total  2578 250


 


Output Total  1100 250


 


Balance  1478 0


 


Weight 108.86 kg 106 kg 











General appearance: PRESENT: no acute distress, cooperative


Respiratory exam: PRESENT: clear to auscultation alejandro


Cardiovascular exam: PRESENT: RRR


GI/Abdominal exam: PRESENT: other - Soft, nondistended, minimal tenderness.


Musculoskeletal exam: PRESENT: other - No swelling





Results


Laboratory Results: 


 





 11/14/17 15:59 





 11/14/17 20:02 





 











  11/14/17 11/14/17 11/14/17





  15:59 15:59 15:59


 


WBC  2.7 L  


 


RBC  4.00  


 


Hgb  11.1 L  


 


Hct  34.3 L  


 


MCV  86  


 


MCH  27.7  


 


MCHC  32.4  


 


RDW  15.6 H  


 


Plt Count  203  


 


Seg Neutrophils %  67.1  


 


Lymphocytes %  20.9  


 


Monocytes %  9.2  


 


Eosinophils %  1.4  


 


Basophils %  1.4  


 


Absolute Neutrophils  1.8  


 


Absolute Lymphocytes  0.6  


 


Absolute Monocytes  0.3  


 


Absolute Eosinophils  0.0  


 


Absolute Basophils  0.0  


 


Sodium   146.9 H 


 


Potassium   3.9 


 


Chloride   106 


 


Carbon Dioxide   30 


 


Anion Gap   11 


 


BUN   8 


 


Creatinine   0.62 


 


Est GFR ( Amer)   > 60 


 


Est GFR (Non-Af Amer)   > 60 


 


Glucose   82 


 


Calcium   9.5 


 


Phosphorus    5.2 H


 


Magnesium    1.9


 


Total Bilirubin   0.5 


 


AST   24 


 


ALT   33 


 


Alkaline Phosphatase   52 


 


Total Protein   6.9 


 


Albumin   4.4 


 


Triglycerides   


 


Cholesterol   


 


LDL Cholesterol Direct   


 


VLDL Cholesterol   


 


HDL Cholesterol   


 


TSH   


 


Free T4   


 


Free T3 pg/mL   














  11/14/17 11/14/17 11/14/17





  15:59 20:02 20:02


 


WBC   


 


RBC   


 


Hgb   


 


Hct   


 


MCV   


 


MCH   


 


MCHC   


 


RDW   


 


Plt Count   


 


Seg Neutrophils %   


 


Lymphocytes %   


 


Monocytes %   


 


Eosinophils %   


 


Basophils %   


 


Absolute Neutrophils   


 


Absolute Lymphocytes   


 


Absolute Monocytes   


 


Absolute Eosinophils   


 


Absolute Basophils   


 


Sodium  147.2 H  145.7 H 


 


Potassium  4.0  3.9 


 


Chloride  106  106 


 


Carbon Dioxide  29  29 


 


Anion Gap  12  11 


 


BUN   8 


 


Creatinine   0.54 


 


Est GFR ( Amer)   > 60 


 


Est GFR (Non-Af Amer)   > 60 


 


Glucose   77 


 


Calcium   9.4 


 


Phosphorus   


 


Magnesium   1.7 


 


Total Bilirubin   


 


AST   


 


ALT   


 


Alkaline Phosphatase   


 


Total Protein   


 


Albumin   


 


Triglycerides   


 


Cholesterol   


 


LDL Cholesterol Direct   


 


VLDL Cholesterol   


 


HDL Cholesterol   


 


TSH    0.26 L


 


Free T4   


 


Free T3 pg/mL   














  11/14/17 11/15/17





  20:02 07:44


 


WBC  


 


RBC  


 


Hgb  


 


Hct  


 


MCV  


 


MCH  


 


MCHC  


 


RDW  


 


Plt Count  


 


Seg Neutrophils %  


 


Lymphocytes %  


 


Monocytes %  


 


Eosinophils %  


 


Basophils %  


 


Absolute Neutrophils  


 


Absolute Lymphocytes  


 


Absolute Monocytes  


 


Absolute Eosinophils  


 


Absolute Basophils  


 


Sodium  


 


Potassium  


 


Chloride  


 


Carbon Dioxide  


 


Anion Gap  


 


BUN  


 


Creatinine  


 


Est GFR ( Amer)  


 


Est GFR (Non-Af Amer)  


 


Glucose  


 


Calcium  


 


Phosphorus  


 


Magnesium  1.7 


 


Total Bilirubin  


 


AST  


 


ALT  


 


Alkaline Phosphatase  


 


Total Protein  


 


Albumin  


 


Triglycerides  59 


 


Cholesterol  162.03 


 


LDL Cholesterol Direct  72 


 


VLDL Cholesterol  12.0 


 


HDL Cholesterol  76 


 


TSH  


 


Free T4   1.54


 


Free T3 pg/mL   4.17








 











  11/14/17 11/14/17 11/15/17





  19:30 20:02 01:10


 


Troponin I  < 0.012   0.013


 


NT-Pro-B Natriuret Pep   167 H 














  11/15/17





  07:44


 


Troponin I  < 0.012


 


NT-Pro-B Natriuret Pep 











Impressions: 


 





Chest X-Ray  11/14/17 00:00


IMPRESSION:  HEART ENLARGED WITHOUT FAILURE.  NO OTHER SIGNIFICANT RADIOGRAPHIC 

FINDING IN THE CHEST.


 














Assessment & Plan





- Diagnosis


(1) Atrial fibrillation with RVR


Is this a current diagnosis for this admission?: Yes   





(2) Gallstones


Is this a current diagnosis for this admission?: Yes   


Plan: 


Status post laparoscopic cholecystectomy.  Patient did well.  Will observe in 

the hospital in light of her cardiac arrhythmias yesterday.  We will plan to 

discharge patient home in the morning.

## 2017-11-15 NOTE — PDOC PROGRESS REPORT
Subjective


Progress Note for:: 11/15/17


Subjective:: 





Feels well.  No chest pain no shortness of breath.  No lightheadedness





Physical Exam


Vital Signs: 


 











Temp Pulse Resp BP Pulse Ox


 


 98.5 F   67   18   119/62   97 


 


 11/15/17 12:50  11/15/17 12:50  11/15/17 12:50  11/15/17 12:50  11/15/17 12:50








 Intake & Output











 11/14/17 11/15/17 11/16/17





 06:59 06:59 06:59


 


Intake Total  2578 


 


Output Total  1100 


 


Balance  1478 


 


Weight 108.86 kg 106 kg 











General appearance: PRESENT: no acute distress, cooperative


Respiratory exam: PRESENT: clear to auscultation alejandro


Cardiovascular exam: PRESENT: RRR


GI/Abdominal exam: PRESENT: other - Soft, nondistended, nontender to palpation.


Extremities exam: PRESENT: other - No swelling.





Results


Laboratory Results: 


 





 11/14/17 15:59 





 11/14/17 20:02 





 











  11/14/17 11/14/17 11/14/17





  15:59 15:59 15:59


 


WBC  2.7 L  


 


RBC  4.00  


 


Hgb  11.1 L  


 


Hct  34.3 L  


 


MCV  86  


 


MCH  27.7  


 


MCHC  32.4  


 


RDW  15.6 H  


 


Plt Count  203  


 


Seg Neutrophils %  67.1  


 


Lymphocytes %  20.9  


 


Monocytes %  9.2  


 


Eosinophils %  1.4  


 


Basophils %  1.4  


 


Absolute Neutrophils  1.8  


 


Absolute Lymphocytes  0.6  


 


Absolute Monocytes  0.3  


 


Absolute Eosinophils  0.0  


 


Absolute Basophils  0.0  


 


Sodium   146.9 H 


 


Potassium   3.9 


 


Chloride   106 


 


Carbon Dioxide   30 


 


Anion Gap   11 


 


BUN   8 


 


Creatinine   0.62 


 


Est GFR ( Amer)   > 60 


 


Est GFR (Non-Af Amer)   > 60 


 


Glucose   82 


 


Calcium   9.5 


 


Phosphorus    5.2 H


 


Magnesium    1.9


 


Total Bilirubin   0.5 


 


AST   24 


 


ALT   33 


 


Alkaline Phosphatase   52 


 


Total Protein   6.9 


 


Albumin   4.4 


 


Triglycerides   


 


Cholesterol   


 


LDL Cholesterol Direct   


 


VLDL Cholesterol   


 


HDL Cholesterol   


 


TSH   


 


Free T4   


 


Free T3 pg/mL   














  11/14/17 11/14/17 11/14/17





  15:59 20:02 20:02


 


WBC   


 


RBC   


 


Hgb   


 


Hct   


 


MCV   


 


MCH   


 


MCHC   


 


RDW   


 


Plt Count   


 


Seg Neutrophils %   


 


Lymphocytes %   


 


Monocytes %   


 


Eosinophils %   


 


Basophils %   


 


Absolute Neutrophils   


 


Absolute Lymphocytes   


 


Absolute Monocytes   


 


Absolute Eosinophils   


 


Absolute Basophils   


 


Sodium  147.2 H  145.7 H 


 


Potassium  4.0  3.9 


 


Chloride  106  106 


 


Carbon Dioxide  29  29 


 


Anion Gap  12  11 


 


BUN   8 


 


Creatinine   0.54 


 


Est GFR ( Amer)   > 60 


 


Est GFR (Non-Af Amer)   > 60 


 


Glucose   77 


 


Calcium   9.4 


 


Phosphorus   


 


Magnesium   1.7 


 


Total Bilirubin   


 


AST   


 


ALT   


 


Alkaline Phosphatase   


 


Total Protein   


 


Albumin   


 


Triglycerides   


 


Cholesterol   


 


LDL Cholesterol Direct   


 


VLDL Cholesterol   


 


HDL Cholesterol   


 


TSH    0.26 L


 


Free T4   


 


Free T3 pg/mL   














  11/14/17 11/15/17





  20:02 07:44


 


WBC  


 


RBC  


 


Hgb  


 


Hct  


 


MCV  


 


MCH  


 


MCHC  


 


RDW  


 


Plt Count  


 


Seg Neutrophils %  


 


Lymphocytes %  


 


Monocytes %  


 


Eosinophils %  


 


Basophils %  


 


Absolute Neutrophils  


 


Absolute Lymphocytes  


 


Absolute Monocytes  


 


Absolute Eosinophils  


 


Absolute Basophils  


 


Sodium  


 


Potassium  


 


Chloride  


 


Carbon Dioxide  


 


Anion Gap  


 


BUN  


 


Creatinine  


 


Est GFR ( Amer)  


 


Est GFR (Non-Af Amer)  


 


Glucose  


 


Calcium  


 


Phosphorus  


 


Magnesium  1.7 


 


Total Bilirubin  


 


AST  


 


ALT  


 


Alkaline Phosphatase  


 


Total Protein  


 


Albumin  


 


Triglycerides  59 


 


Cholesterol  162.03 


 


LDL Cholesterol Direct  72 


 


VLDL Cholesterol  12.0 


 


HDL Cholesterol  76 


 


TSH  


 


Free T4   1.54


 


Free T3 pg/mL   4.17








 











  11/14/17 11/14/17 11/15/17





  19:30 20:02 01:10


 


Troponin I  < 0.012   0.013


 


NT-Pro-B Natriuret Pep   167 H 














  11/15/17





  07:44


 


Troponin I  < 0.012


 


NT-Pro-B Natriuret Pep 











Impressions: 


 





Chest X-Ray  11/14/17 00:00


IMPRESSION:  HEART ENLARGED WITHOUT FAILURE.  NO OTHER SIGNIFICANT RADIOGRAPHIC 

FINDING IN THE CHEST.


 














Assessment & Plan





- Diagnosis


(1) Atrial fibrillation with RVR


Is this a current diagnosis for this admission?: Yes   





(2) Gallstones


Is this a current diagnosis for this admission?: Yes   


Plan: 


Patient underwent cardiology evaluation.  It appears the patient did not have 

atrial fib however she had frequent PVCs.  She has been placed on beta-blocker 

and she is in normal sinus rhythm.  She underwent echo cardiogram which 

demonstrated preserved LV fraction.  Patient has been cleared by cardiology to 

undergo cholecystectomy.  We will proceed with her laparoscopic cholecystectomy 

today.

## 2017-11-15 NOTE — EKG REPORT
SEVERITY:- ABNORMAL ECG -

SINUS TACHYCARDIA

MULTIFORM VENTRICULAR PREMATURE COMPLEXES

PROBABLE LEFT ATRIAL ABNORMALITY

PROBABLE INFERIOR INFARCT, OLD

:

Confirmed by: Elgin Valverde 15-Nov-2017 09:30:57

## 2017-11-16 VITALS — DIASTOLIC BLOOD PRESSURE: 58 MMHG | SYSTOLIC BLOOD PRESSURE: 111 MMHG

## 2017-11-16 RX ADMIN — SPIRONOLACTONE SCH MG: 25 TABLET, FILM COATED ORAL at 09:08

## 2017-11-16 RX ADMIN — ENOXAPARIN SODIUM SCH MG: 40 INJECTION SUBCUTANEOUS at 09:10

## 2017-11-16 RX ADMIN — GABAPENTIN SCH MG: 100 CAPSULE ORAL at 09:09

## 2017-11-16 RX ADMIN — Medication SCH ML: at 06:11

## 2017-11-16 RX ADMIN — METOPROLOL SUCCINATE SCH MG: 25 TABLET, EXTENDED RELEASE ORAL at 10:00

## 2017-11-16 RX ADMIN — OXYCODONE AND ACETAMINOPHEN PRN TAB: 5; 325 TABLET ORAL at 09:19

## 2017-11-16 NOTE — EKG REPORT
SEVERITY:- ABNORMAL ECG -

SINUS ARRHYTHMIA, RATE 46-61

INFERIOR INFARCT, OLD

:

Confirmed by: Elgin Valverde 16-Nov-2017 09:14:50

## 2017-11-16 NOTE — DISCHARGE SUMMARY E
Discharge Summary



NAME: RUSS ANTOINE

MRN:  [id number]        : 1970     AGE: 47Y

ADMITTED: 2017                 DISCHARGED: 2017



DISCHARGE DIAGNOSES:

1.  Premature ventricular contractions.

2.  Symptomatic cholelithiasis.



PROCEDURE PERFORMED DURING HOSPITALIZATION:

Laparoscopic cholecystectomy performed by Dr. Amor Philip on

11/15/2017.



HOSPITAL COURSE:

Patient was scheduled for her laparoscopic cholecystectomy on 2017;

however, she was noted with cardiac dysrhythmia.  It appeared to be atrial

fib with rapid ventricular rate; however, on 12-lead EKG it appeared to be

sinus rhythm with frequent premature ventricular contractions as well as

atrial premature contractions.  She was also noted to have diffuse

low-voltage QRS and a right bundle branch block.  Surgery was canceled. 

She underwent cardiology evaluation.  She ruled out for a myocardial

infarction.  She had no symptoms during her hospital stay, no chest pain,

no shortness of breath, no presyncopal symptoms.  She underwent an

echocardiogram which demonstrated normal left ventricular ejection

fraction.  Cardiology felt that she was fit to undergo surgery.  They did

begin her on a beta blocker which she tolerated very well.  She was

subsequently taken to surgery where she underwent her laparoscopic

cholecystectomy.  She did well postoperatively.  She had minimal pain, was

tolerating a diet well and had stable vital signs at the time of

discharge.  The patient is now being discharged to home in good condition.

She will follow up with me next week.



DISCHARGE MEDICATIONS:

1.  Xanax 0.25 mg p.o. every 12 hours p.r.n.

2.  Gabapentin 100 mg p.o. every 12 hours.

3.  Synthroid 200 mcg p.o. daily.

4.  Toprol XL 25 mg 1 p.o. every 12 hours.

5.  Zoloft 50 mg p.o. daily.

6.  Aldactone 25 mg daily.

7.  Vitamin B complex 1 capsule daily.

8.  Ambien 10 mg p.o. at bedtime p.r.n.

9.  Percocet 1 p.o. every 4 hours p.r.n. pain.



DISCHARGE INSTRUCTIONS:

The patient is encouraged to stay active at home but avoid strenuous

activity.  She may follow a low-fat diet at home.  I will see her back for

followup in my office in a week and will plan her breast cancer surgery in

2 weeks if she continues to do well.





DICTATING PHYSICIAN:  AMOR PHILIP M.D.





[id number]                  DT: [id number]

PHY#: [id number]            DD: 2017

ID:   [id number]           JOB#: [id number]       ACCT: [id number]



cc:AMOR PHILIP M.D.

>

## 2017-11-16 NOTE — PDOC PROGRESS REPORT
Subjective


Progress Note for:: 11/16/17


Subjective:: 





Feels well.  No complaints.





Physical Exam


Vital Signs: 


 











Temp Pulse Resp BP Pulse Ox


 


 98.4 F   61   17   107/57 L  99 


 


 11/16/17 06:01  11/16/17 07:00  11/16/17 06:01  11/16/17 06:01  11/16/17 06:01








 Intake & Output











 11/15/17 11/16/17 11/17/17





 06:59 06:59 06:59


 


Intake Total 2578 4916 


 


Output Total 1100 855 


 


Balance 1478 4061 


 


Weight 106 kg 106.2 kg 











General appearance: PRESENT: no acute distress, cooperative


Respiratory exam: PRESENT: clear to auscultation alejandro


Cardiovascular exam: PRESENT: RRR


GI/Abdominal exam: PRESENT: other - Soft, nondistended, nontender to palpation.


Musculoskeletal exam: PRESENT: other - No swelling





Results


Laboratory Results: 


 





 11/14/17 15:59 





 11/14/17 20:02 





 











  11/15/17





  07:44


 


Free T4  1.54


 


Free T3 pg/mL  4.17








 











  11/14/17 11/14/17 11/15/17





  19:30 20:02 01:10


 


Troponin I  < 0.012   0.013


 


NT-Pro-B Natriuret Pep   167 H 














  11/15/17





  07:44


 


Troponin I  < 0.012


 


NT-Pro-B Natriuret Pep 











Impressions: 


 





Chest X-Ray  11/14/17 00:00


IMPRESSION:  HEART ENLARGED WITHOUT FAILURE.  NO OTHER SIGNIFICANT RADIOGRAPHIC 

FINDING IN THE CHEST.


 














Assessment & Plan





- Diagnosis


(1) Atrial fibrillation with RVR


Is this a current diagnosis for this admission?: Yes   





(2) Gallstones


Is this a current diagnosis for this admission?: Yes   


Plan: 


Status post laparoscopic cholecystectomy.  Patient did well.  DC home.

## 2017-11-19 NOTE — PDOC PROGRESS REPORT
Subjective


Progress Note for:: 11/15/17


Subjective:: 


Patient seen in the morning prior to surgery.  2D echo results were reviewed.  

Patient was cleared for surgery.








Physical Exam


Vital Signs: 


 











Temp Pulse Resp BP Pulse Ox


 


 98.4 F   63   16   115/71   98 


 


 11/15/17 08:00  11/15/17 08:00  11/15/17 08:00  11/15/17 08:00  11/15/17 08:00








 Intake & Output











 11/14/17 11/15/17 11/16/17





 06:59 06:59 06:59


 


Intake Total  2578 


 


Output Total  1100 


 


Balance  1478 


 


Weight 108.86 kg 106 kg 











Exam: 








GENERAL: well-nourished and in no acute distress.  Alert and oriented x3


HEAD: Atraumatic, normocephalic.


EYES: Pupils equal round and reactive to light, extraocular movements intact, 

sclera anicteric, conjunctiva are normal.


ENT: TMs normal, nares patent, oropharynx clear without exudates. Moist mucous 

membranes.  No oral ulcerations or bleeding gums noted


NECK: supple without lymphadenopathy.  Trachea is central.  No cervical or 

axillary lymphadenopathy noted.  Carotids are 2+, JVD WNL 


LUNGS: Respiration seems nonlabored, no significant accessory muscle action 

noted.  Breath sounds clear to auscultation bilaterally and equal noted. No 

wheezes rales or rhonchi noted.  No significant dullness noted on percussion.


CHEST: Palpation of the chest wall shows no significant chest wall tenderness.  

No other significant abnormalities noted.


HEART: Liberal NP, No PSH,  1/6 MUNDO aortic area, 1/6 pan systolic murmur mitral 

area, no rubs, no gallops.


ABDOMEN: Soft, minimal right upper quadrant tenderness appreciated, normoactive 

bowel sounds. No guarding, no rebound.  No rigidity noted . No masses 

appreciated.


EXTREMITIES: Pedal pulses are 1-2+, no calf tenderness noted. No clubbing or 

cyanosis. trace pedal edema noted


NEUROLOGICAL: Focused neurological exam showed no significant neurologic 

deficit. Normal speech, no focal weakness appreciated. 


PSYCH: Normal mood, normal affect.  Judgment and insight within normal limits.


SKIN: No significant ecchymosis, rash, ulcerations or signs of pruritus noted.


MUSCULOSKELETAL EXAM: No significant joint swelling noted.





Results


Laboratory Results: 


 





 11/14/17 15:59 





 11/14/17 20:02 





 











  11/14/17 11/14/17 11/14/17





  15:59 15:59 15:59


 


WBC  2.7 L  


 


RBC  4.00  


 


Hgb  11.1 L  


 


Hct  34.3 L  


 


MCV  86  


 


MCH  27.7  


 


MCHC  32.4  


 


RDW  15.6 H  


 


Plt Count  203  


 


Seg Neutrophils %  67.1  


 


Lymphocytes %  20.9  


 


Monocytes %  9.2  


 


Eosinophils %  1.4  


 


Basophils %  1.4  


 


Absolute Neutrophils  1.8  


 


Absolute Lymphocytes  0.6  


 


Absolute Monocytes  0.3  


 


Absolute Eosinophils  0.0  


 


Absolute Basophils  0.0  


 


Sodium   146.9 H 


 


Potassium   3.9 


 


Chloride   106 


 


Carbon Dioxide   30 


 


Anion Gap   11 


 


BUN   8 


 


Creatinine   0.62 


 


Est GFR ( Amer)   > 60 


 


Est GFR (Non-Af Amer)   > 60 


 


Glucose   82 


 


Calcium   9.5 


 


Phosphorus    5.2 H


 


Magnesium    1.9


 


Total Bilirubin   0.5 


 


AST   24 


 


ALT   33 


 


Alkaline Phosphatase   52 


 


Total Protein   6.9 


 


Albumin   4.4 


 


Triglycerides   


 


Cholesterol   


 


LDL Cholesterol Direct   


 


VLDL Cholesterol   


 


HDL Cholesterol   


 


TSH   














  11/14/17 11/14/17 11/14/17





  15:59 20:02 20:02


 


WBC   


 


RBC   


 


Hgb   


 


Hct   


 


MCV   


 


MCH   


 


MCHC   


 


RDW   


 


Plt Count   


 


Seg Neutrophils %   


 


Lymphocytes %   


 


Monocytes %   


 


Eosinophils %   


 


Basophils %   


 


Absolute Neutrophils   


 


Absolute Lymphocytes   


 


Absolute Monocytes   


 


Absolute Eosinophils   


 


Absolute Basophils   


 


Sodium  147.2 H  145.7 H 


 


Potassium  4.0  3.9 


 


Chloride  106  106 


 


Carbon Dioxide  29  29 


 


Anion Gap  12  11 


 


BUN   8 


 


Creatinine   0.54 


 


Est GFR ( Amer)   > 60 


 


Est GFR (Non-Af Amer)   > 60 


 


Glucose   77 


 


Calcium   9.4 


 


Phosphorus   


 


Magnesium   1.7 


 


Total Bilirubin   


 


AST   


 


ALT   


 


Alkaline Phosphatase   


 


Total Protein   


 


Albumin   


 


Triglycerides   


 


Cholesterol   


 


LDL Cholesterol Direct   


 


VLDL Cholesterol   


 


HDL Cholesterol   


 


TSH    0.26 L














  11/14/17





  20:02


 


WBC 


 


RBC 


 


Hgb 


 


Hct 


 


MCV 


 


MCH 


 


MCHC 


 


RDW 


 


Plt Count 


 


Seg Neutrophils % 


 


Lymphocytes % 


 


Monocytes % 


 


Eosinophils % 


 


Basophils % 


 


Absolute Neutrophils 


 


Absolute Lymphocytes 


 


Absolute Monocytes 


 


Absolute Eosinophils 


 


Absolute Basophils 


 


Sodium 


 


Potassium 


 


Chloride 


 


Carbon Dioxide 


 


Anion Gap 


 


BUN 


 


Creatinine 


 


Est GFR ( Amer) 


 


Est GFR (Non-Af Amer) 


 


Glucose 


 


Calcium 


 


Phosphorus 


 


Magnesium  1.7


 


Total Bilirubin 


 


AST 


 


ALT 


 


Alkaline Phosphatase 


 


Total Protein 


 


Albumin 


 


Triglycerides  59


 


Cholesterol  162.03


 


LDL Cholesterol Direct  72


 


VLDL Cholesterol  12.0


 


HDL Cholesterol  76


 


TSH 








 











  11/14/17 11/14/17 11/15/17





  19:30 20:02 01:10


 


Troponin I  < 0.012   0.013


 


NT-Pro-B Natriuret Pep   167 H 














  11/15/17





  07:44


 


Troponin I  < 0.012


 


NT-Pro-B Natriuret Pep 











Impressions: 


 





Chest X-Ray  11/14/17 00:00


IMPRESSION:  HEART ENLARGED WITHOUT FAILURE.  NO OTHER SIGNIFICANT RADIOGRAPHIC 

FINDING IN THE CHEST.


 














Assessment & Plan





- Diagnosis


(1) Abnormal EKG


Is this a current diagnosis for this admission?: Yes   





(2) Cardiac dysrhythmia, unspecified


Qualifiers: 


   Arrhythmia type: other cardiac arrhythmia   Qualified Code(s): I49.8 - Other 

specified cardiac arrhythmias   


Is this a current diagnosis for this admission?: Yes   





(3) Sleep apnea syndrome


Qualifiers: 


   Sleep apnea type: unspecified type   Qualified Code(s): G47.30 - Sleep apnea

, unspecified   


Is this a current diagnosis for this admission?: Yes   





(4) Obesity


Qualifiers: 


   Obesity type: unspecified obesity type 


Is this a current diagnosis for this admission?: Yes   





(5) Gallstones and inflammation of gallbladder without obstruction


Is this a current diagnosis for this admission?: Yes   





- Notes


Notes: 





Preop cardiovascular evaluation: Patient cleared for surgery based on 

echocardiogram report.


Cardiac dysrhythmia: This has improved.  Electrolytes reviewed.  Continue 

patient on beta-blocker therapy.


Sleep apnea syndrome: Sleep apnea syndrome can cause cardiac dysrhythmia.  This 

will be evaluated as an outpatient.


Obesity: To be evaluated as an outpatient.


Gallstones with cholecystitis.  Patient cleared for gallbladder surgery.


Breast cancer: I am told by the surgeon that breast cancer surgery is fairly 

urgent and needs to be performed within the next 2-3 weeks.  Will try optimize 

medical management for this patient.








- Time


Time with patient: 15-25 minutes - CODE STATUS was discussed, patient remains 

full code.  Surrogate decision-maker patient's  multiple medical 

problems were addressed.  More than 50% of the time spent coordinating care, 

discussing management plans with involved caregivers.  Management plans 

discussed with involved personnels.  Medical decision making was of moderate to 

high complexity, patient's has multiple  comorbidities.


Medications reviewed and adjusted accordingly: Yes

## 2017-11-19 NOTE — PDOC PROGRESS REPORT
Subjective


Progress Note for:: 11/16/17


Subjective:: 


Patient seen in the morning after surgery.  2D echo results were reviewed with 

the patient again patient has done well with surgery.  Telemetry strips 

reviewed showed sinus rhythm without any significant dysrhythmia.








Physical Exam


Vital Signs: 


 











Temp Pulse Resp BP Pulse Ox


 


 97.8 F   58 L  17   111/58 L  94 


 


 11/16/17 10:03  11/16/17 10:03  11/16/17 10:03  11/16/17 10:03  11/16/17 10:03











Exam: 








GENERAL: well-nourished and in no acute distress.  Alert and oriented x3


HEAD: Atraumatic, normocephalic.


EYES: Pupils equal round and reactive to light, extraocular movements intact, 

sclera anicteric, conjunctiva are normal.


ENT: TMs normal, nares patent, oropharynx clear without exudates. Moist mucous 

membranes.  No oral ulcerations or bleeding gums noted


NECK: supple without lymphadenopathy.  Trachea is central.  No cervical or 

axillary lymphadenopathy noted.  Carotids are 2+, JVD WNL 


LUNGS: Respiration seems nonlabored, no significant accessory muscle action 

noted.  Breath sounds clear to auscultation bilaterally and equal noted. No 

wheezes rales or rhonchi noted.  No significant dullness noted on percussion.


CHEST: Palpation of the chest wall shows no significant chest wall tenderness.  

No other significant abnormalities noted.


HEART: Merrill NP, No PSH,  1/6 MUNDO aortic area, 1/6 pan systolic murmur mitral 

area, no rubs, no gallops.


ABDOMEN: Soft, mild right upper quadrant incisional tenderness appreciated, 

normoactive bowel sounds. No guarding, no rebound.  No rigidity noted . No 

masses appreciated.


EXTREMITIES: Pedal pulses are 1-2+, no calf tenderness noted. No clubbing or 

cyanosis.trace to 1+ pedal edema noted


NEUROLOGICAL: Focused neurological exam showed no significant neurologic 

deficit. Normal speech, no focal weakness appreciated. 


PSYCH: Normal mood, normal affect.  Judgment and insight within normal limits.


SKIN: No significant ecchymosis, rash, ulcerations or signs of pruritus noted.


MUSCULOSKELETAL EXAM: No significant joint swelling noted.





Results


Laboratory Results: 


 





 11/14/17 15:59 





 11/14/17 20:02 





 











  11/14/17 11/14/17 11/15/17





  19:30 20:02 01:10


 


Troponin I  < 0.012   0.013


 


NT-Pro-B Natriuret Pep   167 H 














  11/15/17





  07:44


 


Troponin I  < 0.012


 


NT-Pro-B Natriuret Pep 











EKG Comments: 





Twelve-lead EKG postop was reviewed.  It showed no significant abnormalities.


Impressions: 


 





Chest X-Ray  11/14/17 00:00


IMPRESSION:  HEART ENLARGED WITHOUT FAILURE.  NO OTHER SIGNIFICANT RADIOGRAPHIC 

FINDING IN THE CHEST.


 














Assessment & Plan





- Diagnosis


(1) Abnormal EKG


Is this a current diagnosis for this admission?: Yes   





(2) Cardiac dysrhythmia, unspecified


Qualifiers: 


   Arrhythmia type: other cardiac arrhythmia   Qualified Code(s): I49.8 - Other 

specified cardiac arrhythmias   


Is this a current diagnosis for this admission?: Yes   





(3) Sleep apnea syndrome


Qualifiers: 


   Sleep apnea type: unspecified type   Qualified Code(s): G47.30 - Sleep apnea

, unspecified   


Is this a current diagnosis for this admission?: Yes   





(4) Obesity


Qualifiers: 


   Obesity type: unspecified obesity type 


Is this a current diagnosis for this admission?: Yes   





(5) Gallstones and inflammation of gallbladder without obstruction


Is this a current diagnosis for this admission?: Yes   





- Notes


Notes: 





Postop day 1: Patient has done well.  Will sign off.


Cardiac dysrhythmia: Continue patient on beta-blocker therapy.


Sleep apnea syndrome: Sleep apnea syndrome can cause cardiac dysrhythmia.  This 

will be evaluated as an outpatient.


Obesity: To be evaluated as an outpatient.


Gallstones with cholecystitis.  Patient was supposed to undergo gallbladder 

surgery but this was postponed.  Hopefully patient could have surgery tomorrow.


Breast cancer: I am told by the surgeon that breast cancer surgery is fairly 

urgent and needs to be performed within the next 2-3 weeks.  Patient cleared 

for this surgery unless some symptoms of breath in between.








- Time


Time with patient: 15-25 minutes - CODE STATUS was discussed, patient remains 

full code.  Surrogate decision-maker unchanged.  Multiple medical problems were 

addressed.  More than 50% of the time spent coordinating care, discussing 

management plans with involved caregivers.  Management plans discussed with 

involved personnels.  Medical decision making was of moderate to high complexity

, patient's has multiple  comorbidities.


Medications reviewed and adjusted accordingly: Yes

## 2017-11-20 LAB
ANION GAP SERPL CALC-SCNC: 13 MMOL/L (ref 5–19)
BUN SERPL-MCNC: 9 MG/DL (ref 7–20)
CALCIUM: 9.7 MG/DL (ref 8.4–10.2)
CHLORIDE SERPL-SCNC: 100 MMOL/L (ref 98–107)
CO2 SERPL-SCNC: 31 MMOL/L (ref 22–30)
CREAT SERPL-MCNC: 0.59 MG/DL (ref 0.52–1.25)
ERYTHROCYTE [DISTWIDTH] IN BLOOD BY AUTOMATED COUNT: 15 % (ref 11.5–14)
GLUCOSE SERPL-MCNC: 94 MG/DL (ref 75–110)
HCT VFR BLD CALC: 36.5 % (ref 36–47)
HGB BLD-MCNC: 12 G/DL (ref 12–15.5)
HGB HCT DIFFERENCE: -0.5
MCH RBC QN AUTO: 28 PG (ref 27–33.4)
MCHC RBC AUTO-ENTMCNC: 32.9 G/DL (ref 32–36)
MCV RBC AUTO: 85 FL (ref 80–97)
POTASSIUM SERPL-SCNC: 4.1 MMOL/L (ref 3.6–5)
RBC # BLD AUTO: 4.29 10^6/UL (ref 3.72–5.28)
SODIUM SERPL-SCNC: 144.2 MMOL/L (ref 137–145)
WBC # BLD AUTO: 4.4 10^3/UL (ref 4–10.5)

## 2017-11-20 NOTE — EKG REPORT
SEVERITY:- ABNORMAL ECG -

SINUS RHYTHM

VENTRICULAR PREMATURE COMPLEX

INFERIOR INFARCT, OLD

:

Confirmed by: Elgin Valverde 20-Nov-2017 13:57:20

## 2017-11-28 ENCOUNTER — HOSPITAL ENCOUNTER (INPATIENT)
Dept: HOSPITAL 62 - OROUT | Age: 47
LOS: 1 days | Discharge: HOME | DRG: 581 | End: 2017-11-29
Attending: SURGERY | Admitting: SURGERY
Payer: OTHER GOVERNMENT

## 2017-11-28 DIAGNOSIS — R59.0: ICD-10-CM

## 2017-11-28 DIAGNOSIS — E89.0: ICD-10-CM

## 2017-11-28 DIAGNOSIS — Z81.1: ICD-10-CM

## 2017-11-28 DIAGNOSIS — Z88.6: ICD-10-CM

## 2017-11-28 DIAGNOSIS — Z79.899: ICD-10-CM
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
## 2017-11-28 DIAGNOSIS — Z87.891: ICD-10-CM

## 2017-11-28 DIAGNOSIS — F32.9: ICD-10-CM

## 2017-11-28 DIAGNOSIS — C50.912: Primary | ICD-10-CM

## 2017-11-28 PROCEDURE — 0HTV0ZZ RESECTION OF BILATERAL BREAST, OPEN APPROACH: ICD-10-PCS | Performed by: SURGERY

## 2017-11-28 PROCEDURE — 93010 ELECTROCARDIOGRAM REPORT: CPT

## 2017-11-28 PROCEDURE — 80048 BASIC METABOLIC PNL TOTAL CA: CPT

## 2017-11-28 PROCEDURE — 81025 URINE PREGNANCY TEST: CPT

## 2017-11-28 PROCEDURE — L8000 MASTECTOMY BRA: HCPCS

## 2017-11-28 PROCEDURE — 93005 ELECTROCARDIOGRAM TRACING: CPT

## 2017-11-28 PROCEDURE — 88309 TISSUE EXAM BY PATHOLOGIST: CPT

## 2017-11-28 PROCEDURE — 85027 COMPLETE CBC AUTOMATED: CPT

## 2017-11-28 PROCEDURE — 36415 COLL VENOUS BLD VENIPUNCTURE: CPT

## 2017-11-28 PROCEDURE — 88307 TISSUE EXAM BY PATHOLOGIST: CPT

## 2017-11-28 PROCEDURE — A9520 TC99 TILMANOCEPT DIAG 0.5MCI: HCPCS

## 2017-11-28 PROCEDURE — 76098 X-RAY EXAM SURGICAL SPECIMEN: CPT

## 2017-11-28 PROCEDURE — 88342 IMHCHEM/IMCYTCHM 1ST ANTB: CPT

## 2017-11-28 PROCEDURE — 07B50ZX EXCISION OF RIGHT AXILLARY LYMPHATIC, OPEN APPROACH, DIAGNOSTIC: ICD-10-PCS | Performed by: SURGERY

## 2017-11-28 PROCEDURE — 78195 LYMPH SYSTEM IMAGING: CPT

## 2017-11-28 RX ADMIN — FENTANYL CITRATE PRN MCG: 50 INJECTION INTRAMUSCULAR; INTRAVENOUS at 18:02

## 2017-11-28 RX ADMIN — Medication SCH ML: at 21:04

## 2017-11-28 RX ADMIN — FENTANYL CITRATE PRN MCG: 50 INJECTION INTRAMUSCULAR; INTRAVENOUS at 18:30

## 2017-11-28 NOTE — PDOC PROGRESS REPORT
Subjective


Progress Note for:: 11/28/17


Subjective:: 


Feels well no complaints





Reason For Visit: 


C50.912 MALIGNANT NEOPLASM OF UNSPECIFIED S, R59.0








Physical Exam


Vital Signs: 


 











Temp Pulse Resp BP Pulse Ox


 


 98.3 F   59 L  16   121/64   97 


 


 11/28/17 08:00  11/28/17 08:00  11/28/17 08:00  11/28/17 08:00  11/28/17 08:00








 Intake & Output











 11/27/17 11/28/17 11/29/17





 06:59 06:59 06:59


 


Intake Total   0


 


Balance   0


 


Weight   105.69 kg











General appearance: PRESENT: no acute distress, cooperative


Respiratory exam: PRESENT: clear to auscultation alejandro, other - Dressings intact.

  No swelling.  Drain output is blood-tinged.


Cardiovascular exam: PRESENT: RRR





Results


Laboratory Results: 


 





 11/20/17 10:40 





 11/20/17 10:40 








Impressions: 


 





Lymph Scan Nuclear Medicine  11/28/17 00:00


IMPRESSION:  ADMINISTRATION OF RADIOPHARMACEUTICAL FOR SENTINEL LYMPH NODE 

EVALUATION.


 














Assessment & Plan





- Diagnosis


(1) Breast cancer, left


Is this a current diagnosis for this admission?: Yes   


Plan: 


Status post bilateral mastectomies.  Patient doing well.  Will likely discharge 

patient home tomorrow.  May ambulate tonight.  Hold off Lovenox in light of the 

bloody output via drains.

## 2017-11-28 NOTE — RADIOLOGY REPORT (SQ)
EXAM DESCRIPTION:  NM LYMPHATICS/LYMPH GLANDS



COMPLETED DATE/TIME:  11/28/2017 11:36 am



REASON FOR STUDY:  BREAST CANCER C50.912  MALIGNANT NEOPLASM OF UNSPECIFIED SITE OF LEFT FEMAL R59.0 
 LOCALIZED ENLARGED LYMPH NODES



COMPARISON:  Multiple previous breast imaging including breast MRI 10/31/2017



RADIONUCLIDE AND DOSE:  537 microcuries TC-99mtilmanocept - [employer].

The route of agent administration:  Subcutaneous in the skin.



TECHNIQUE:  The skin of the right breast was prepped in sterile fashion.  The radiopharmaceutical was
 administered in equally divided doses in the periareolar breast.



LIMITATIONS:  None.



FINDINGS:  Images demonstrate activity at the injection site.  Migration of activity for its 8 right 
axillary lymph node with was marked.



IMPRESSION:  ADMINISTRATION OF RADIOPHARMACEUTICAL FOR SENTINEL LYMPH NODE EVALUATION.



TECHNICAL DOCUMENTATION:  JOB ID:  [id number]

 2011 [email or web address]- All Rights Reserved

## 2017-11-28 NOTE — OPERATIVE REPORT
Operative Report


DATE OF SURGERY: 11/28/17


PREOPERATIVE DIAGNOSIS: Left breast cancer


POSTOPERATIVE DIAGNOSIS: Same


OPERATION: Left modified radical mastectomy.  Right simple mastectomy.  Right 

axillary sentinel node biopsy.  Injection of blue dye for identification of 

right axillary sentinel node.


SURGEON: MARY PHILIP


ANESTHESIA: GA


TISSUE REMOVED OR ALTERED: Right breast.  Right axillary sentinel node.  Left 

breast and axillary dissection.


COMPLICATIONS: 





None


ESTIMATED BLOOD LOSS: 300 cc


INTRAOPERATIVE FINDINGS: Enlarged left axillary nodes.


PROCEDURE: 





Informed consent was obtained.  Patient was brought to the operating room and 

placed on the operating table in the supine position.  After satisfactory 

induction of general anesthesia, after prepping with alcohol, blue dye was 

injected into the periareolar location on the right breast and breast massage 

was performed for 5 minutes.  Prior to arrival in the operating room patient 

underwent lymphoscintigraphy for identification of right axillary sentinel 

node.  After prepping with alcohol blue dye was injected at the patient's left 

upper inner arm for lymphatic mapping of the drainage of the arm in the axilla.

  Patient's bilateral breast and axilla were prepped and draped in usual 

sterile fashion.  Right mastectomy was performed first.  Taking a ellipse of 

skin encompassing the nipple areolar complex.  Superior, inferior then lateral 

flaps were all raised.  The breast was taken off the pectoralis along with the 

pectoralis fascia.  The axilla was entered and using the neoprobe and 

visualization a hot blue node was identified.  The instant count was 32,000, 

the 10 second count was too high for the machine.  There was some activity in 

the axilla but the activity was in the 200's, far below 10% of the sentinel 

node count.  No other blue nodes were seen.  No nodes were palpable. hemostasis 

appeared excellent.  Operative field was irrigated with sterile water and 

irrigant aspirated out.  2 Cong-Neil drains were placed and brought out 

through separate stab incisions and sutured in place.  The wound was closed 

with deep dermal interrupted Vicryl sutures followed by staple closure of the 

skin.  Attention was then directed toward the left side.  Ellipse of skin was 

taken along with the nipple areolar complex, superior, inferior, and lateral 

flaps were raised.  The breast was taken off the pectoralis along with the 

pectoralis fascia.  Axillary dissection was taken in continuity.  The true 

axilla was entered.  Critical structures were identified.  Long thoracic nerve 

and the thoracodorsal nerves were identified and protected during the 

dissection as well was the median pectoral nerve.  Complete level 1 and level 2 

dissection was performed.  Blue lymphatic tracts were noted above the level of 

the axillary vein and the dissection was not performed above the level of the 

axillary vein.  Patient had multiple enlarged axillary nodes.  The specimen was 

marked with a short stitch marking the superior border, long stitch marking the 

lateral border and a super long stitch marking the axillary dissection 

contents.  Hemostasis appeared excellent.  2 Cong-Neil drains were placed 

and sutured in place.  Operative field was irrigated with sterile water.  Wound 

was closed with deep dermal interrupted Vicryl sutures followed by staple 

closure of the skin.  Patient tolerated procedure well with no apparent 

complications and was taken to the recovery area in stable condition.

## 2017-11-29 VITALS — DIASTOLIC BLOOD PRESSURE: 67 MMHG | SYSTOLIC BLOOD PRESSURE: 131 MMHG

## 2017-11-29 RX ADMIN — Medication SCH ML: at 05:07

## 2017-11-29 NOTE — PDOC PROGRESS REPORT
Subjective


Subjective:: 


Feels well.  No complaints.





Reason For Visit: 


C50.912 MALIGNANT NEOPLASM OF UNSPECIFIED S, R59.0








Physical Exam


Vital Signs: 


 











Temp Pulse Resp BP Pulse Ox


 


 98.3 F   63   18   131/67 H  100 


 


 11/29/17 08:12  11/29/17 08:12  11/29/17 08:12  11/29/17 08:12  11/29/17 08:12








 Intake & Output











 11/28/17 11/29/17 11/30/17





 06:59 06:59 06:59


 


Intake Total  4680 


 


Output Total  1905 


 


Balance  2775 


 


Weight  105.1 kg 











General appearance: PRESENT: no acute distress, cooperative


Respiratory exam: PRESENT: clear to auscultation alejandro, other - Dressings are 

intact.  JESÚS drain output is blood-tinged but mostly serous.  There is no 

lymphedema


Cardiovascular exam: PRESENT: RRR


Extremities exam: PRESENT: other - No swelling and no tenderness





Results


Laboratory Results: 


 





 11/20/17 10:40 





 11/20/17 10:40 








Impressions: 


 





Lymph Scan Nuclear Medicine  11/28/17 00:00


IMPRESSION:  ADMINISTRATION OF RADIOPHARMACEUTICAL FOR SENTINEL LYMPH NODE 

EVALUATION.


 














Assessment & Plan





- Diagnosis


(1) Breast cancer, left


Is this a current diagnosis for this admission?: Yes   


Plan: 


Status post bilateral mastectomies.  Patient doing well.  DC home.  Follow-up 

next week.  Encourage activity at home

## 2017-11-29 NOTE — DISCHARGE SUMMARY E
Discharge Summary



NAME: RUSS ANTOINE

MRN:  [id number]        : 1970     AGE: 47Y

ADMITTED: 2017                  DISCHARGED: 2017



DISCHARGE DIAGNOSIS:

Left-sided breast cancer.



PROCEDURE PERFORMED DURING HOSPITALIZATION:

Left modified radical mastectomy, right mastectomy with right axillary

sentinel node biopsy.  Injection of blue dye for identification of

sentinel node.  All performed on 2017.



HOSPITAL COURSE:

The patient underwent the above mentioned surgery.  She did well

postoperatively.  Her pain was under good control.  She had no swelling. 

Her drain output was clearing up at the time of discharge.  Her vital

signs remained stable and her cardiopulmonary exam was normal.  Patient is

now being discharged to home in good condition.  She will follow up with

me next week.  She is encouraged to stay active at home and do range of

motion exercises with both of her arms.  She is to keep her arms elevated

when she is at rest.  She is to measure her drain output each day.  She

may shower tomorrow night.  She may resume all of her home medications. 

Additional medication is Percocet 1 p.o. q.4 hours p.r.n. pain.







DICTATING PHYSICIAN:  AMOR PHILIP M.D.





[id number]                  DT: 2017    1008

PHY#: [id number]            DD: [id number]

ID:   [id number]           JOB#: [id number]       ACCT: [id number]



cc:AMOR PHILIP M.D.

>

## 2017-11-29 NOTE — WOMENS IMAGING REPORT
EXAM DESCRIPTION:  BREAST SPECIMEN



COMPLETED DATE/TIME:  11/29/2017 10:39 am



REASON FOR STUDY:  LEFT BREAST C50.912  MALIGNANT NEOPLASM OF UNSPECIFIED SITE OF LEFT FEMAL R59.0  L
OCALIZED ENLARGED LYMPH NODES



COMPARISON:  None.



TECHNIQUE:  Specimen radiograph from breast procedure performed in the operating room.



LIMITATIONS:  None.



FINDINGS:  Specimen radiograph from breast procedure performed in the operating room.

Please see procedure note for details and final pathology.



IMPRESSION:  Specimen radiograph.



TECHNICAL DOCUMENTATION:  JOB ID:  [id number]

## 2017-12-28 ENCOUNTER — HOSPITAL ENCOUNTER (OUTPATIENT)
Dept: HOSPITAL 62 - SP | Age: 47
End: 2017-12-28
Attending: SURGERY
Payer: OTHER GOVERNMENT

## 2017-12-28 DIAGNOSIS — M79.602: Primary | ICD-10-CM

## 2017-12-28 PROCEDURE — 93971 EXTREMITY STUDY: CPT

## 2017-12-28 NOTE — RADIOLOGY REPORT (SQ)
EXAM DESCRIPTION:  VENOUS UNILATERAL UPPER



COMPLETED DATE/TIME:  12/28/2017 3:54 pm



REASON FOR STUDY:  LUE PAIN M79.603  PAIN IN ARM, UNSPECIFIED



COMPARISON:  None.



TECHNIQUE:  Dynamic and static gray scale and color images acquired of the left arm venous system. Se
lected spectral images acquired with additional compression and augmentation maneuvers. The contralat
eral subclavian vein and internal jugular vein were also imaged. Images stored on PACS.



LIMITATIONS:  None.



FINDINGS:  INTERNAL JUGULAR VEIN: Normal phasicity, compression, augmentation. No visualized echogeni
c material on gray scale. No defects on color images. Comparison opposite side normal.

SUBCLAVIAN VEIN: Normal compression, augmentation. No visualized echogenic material on gray scale. No
 defects on color images.

AXILLARY VEIN: Normal compression, augmentation. No visualized echogenic material on gray scale. No d
efects on color images.

BRACHIAL VEIN: Normal compression, augmentation. No visualized echogenic material on gray scale. No d
efects on color images.

BASILIC VEIN: Normal compression, augmentation. No visualized echogenic material on gray scale. No de
fects on color images.

CEPHALIC VEIN: Normal compression, augmentation. No visualized echogenic material on gray scale. No d
efects on color images.

OTHER: No other significant finding.

CONTRALATERAL SUBCLAVIAN VEIN AND INTERNAL JUGULAR VEIN:

Normal phasicity, compression and augmentation. No visualized echogenic material on gray scale. No de
fects on color images.



IMPRESSION:  NO EVIDENCE DVT OR SVT IN THE LEFT ARM.



TECHNICAL DOCUMENTATION:  JOB ID:  [id number]

 2011 [employer]- All Rights Reserved

## 2018-01-02 ENCOUNTER — HOSPITAL ENCOUNTER (OUTPATIENT)
Dept: HOSPITAL 62 - RAD | Age: 48
End: 2018-01-02
Attending: INTERNAL MEDICINE
Payer: OTHER GOVERNMENT

## 2018-01-02 DIAGNOSIS — C50.412: Primary | ICD-10-CM

## 2018-01-02 PROCEDURE — 71260 CT THORAX DX C+: CPT

## 2018-01-02 PROCEDURE — 74177 CT ABD & PELVIS W/CONTRAST: CPT

## 2018-01-02 NOTE — RADIOLOGY REPORT (SQ)
EXAM DESCRIPTION:  CT CHEST WITH



COMPLETED DATE/TIME:  1/2/2018 10:40 am



REASON FOR STUDY:  BREAST CA (C50.412) C50.412  MALIG NEOPLASM OF UPPER-OUTER QUADRANT OF LEFT FEMAL



COMPARISON:  PET-CT 10/22/2017.



TECHNIQUE:  CT scan of the chest performed using helical scanning technique with dynamic intravenous 
contrast injection.  Images reviewed with lung, soft tissue and bone windows.  Reconstructed coronal 
and sagittal MPR images reviewed.  All images stored on PACS.

All CT scanners at this facility use dose modulation, iterative reconstruction, and/or weight based d
osing when appropriate to reduce radiation dose to as low as reasonably achievable (ALARA).

[facility]: Dose Right  [facility]: CareDose    [facility]: Dose Right    [facility]: Teradose 4D    [facility]: Smart [employer]



CONTRAST TYPE AND DOSE:  See separate report of the same date.



RENAL FUNCTION:  See separate report.



RADIATION DOSE:   .



LIMITATIONS:  None.



FINDINGS:  LUNGS AND PLEURA: No opacities, nodules, masses.  No pneumothorax. No effusions.

HILAR AND MEDIASTINAL STRUCTURES: No identified masses or abnormal nodes.

HEART AND VASCULAR STRUCTURES: No aneurysm or dissection.  No central pulmonary emboli.  No pericardi
al effusion.

HARDWARE: None in the chest.

UPPER ABDOMEN: See separate report of the CT of the abdomen.

THYROID AND OTHER SOFT TISSUES: Left axillary node image 12 measuring about 1.6 cm in diameter not se
en on the prior PET.  More inferior left axillary clips.

BONES: No significant finding.

OTHER: Right-sided port tip in the SVC.



IMPRESSION:  1.5 cm left axillary node which is new from the PET 10/22/2017.



TECHNICAL DOCUMENTATION:  JOB ID:  [id number]

Quality ID # 436: Final reports with documentation of one or more dose reduction techniques (e.g., Au
tomated exposure control, adjustment of the mA and/or kV according to patient size, use of iterative 
reconstruction technique)

 2011 [employer]- All Rights Reserved

## 2018-01-02 NOTE — RADIOLOGY REPORT (SQ)
EXAM DESCRIPTION:  CT ABD/PELVIS WITH IV   ORAL



COMPLETED DATE/TIME:  1/2/2018 10:40 am



REASON FOR STUDY:  BREAST CA (C50.412) C50.412  MALIG NEOPLASM OF UPPER-OUTER QUADRANT OF LEFT FEMAL



COMPARISON:  None.



TECHNIQUE:  CT scan of the abdomen and pelvis performed using helical scanning technique with dynamic
 intravenous contrast injection.  No oral contrast. Images reviewed with lung, soft tissue, and bone 
windows. Reconstructed coronal and sagittal MPR images reviewed. Delayed images for evaluation of the
 urinary system also acquired. All images stored on PACS.

All CT scanners at this facility use dose modulation, iterative reconstruction, and/or weight based d
osing when appropriate to reduce radiation dose to as low as reasonably achievable (ALARA).

[facility]: Dose Right  [facility]: CareDose    [facility]: Dose Right    [facility]: Teradose 4D    [facility]: [employer]



CONTRAST TYPE AND DOSE:  contrast/concentration: Isovue 370.00 mg/ml; Total Contrast Delivered: 100.0
 ml; Total Saline Delivered: 59.0 ml



RENAL FUNCTION:  GFR > 60.



RADIATION DOSE:  CT Rad equipment meets quality standard of care and radiation dose reduction techniq
ues were employed. CTDIvol: 15.4 - 17.1 mGy. DLP: 2330 mGy-cm..



LIMITATIONS:  None.



FINDINGS:  LOWER CHEST: No significant findings. No nodules or infiltrates.

LIVER: Normal size. No masses.  No dilated ducts.

SPLEEN: Normal size. No focal lesions.

PANCREAS: No masses. No significant calcifications. No adjacent inflammation or peripancreatic fluid 
collections. Pancreatic duct not dilated.

GALLBLADDER: Surgically absent.

ADRENAL GLANDS: No significant masses or asymmetry.

RIGHT KIDNEY AND URETER: No solid masses.   No significant calcifications.   No hydronephrosis or hyd
roureter.

LEFT KIDNEY AND URETER: No solid masses.   No significant calcifications.   No hydronephrosis or hydr
oureter.

AORTA AND VESSELS: No aneurysm. No dissection. Renal arteries, SMA, celiac without stenosis.

RETROPERITONEUM: No retroperitoneal adenopathy, hemorrhage or masses.

BOWEL AND PERITONEAL CAVITY: No masses or inflammatory changes. No free fluid or peritoneal masses.

APPENDIX: Normal.

PELVIS: No mass.  No free fluid. Normal bladder.

ABDOMINAL WALL: No masses. No hernias.

BONES: No significant or acute findings.

OTHER: No other significant finding.



IMPRESSION:  No evidence of metastatic disease.



TECHNICAL DOCUMENTATION:  JOB ID:  [id number]

Quality ID # 436: Final reports with documentation of one or more dose reduction techniques (e.g., Au
tomated exposure control, adjustment of the mA and/or kV according to patient size, use of iterative 
reconstruction technique)

 2011 [employer]- All Rights Reserved

## 2018-01-21 ENCOUNTER — HOSPITAL ENCOUNTER (OUTPATIENT)
Dept: HOSPITAL 62 - RAD | Age: 48
End: 2018-01-21
Attending: INTERNAL MEDICINE
Payer: COMMERCIAL

## 2018-01-21 DIAGNOSIS — C50.412: Primary | ICD-10-CM

## 2018-01-21 PROCEDURE — A9552 F18 FDG: HCPCS

## 2018-01-21 PROCEDURE — 78815 PET IMAGE W/CT SKULL-THIGH: CPT

## 2018-01-22 NOTE — RADIOLOGY REPORT (SQ)
EXAM DESCRIPTION:  PET CT SKULL/THIGH



COMPLETED DATE/TIME:  1/21/2018 9:21 pm



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



COMPARISON:  CT chest abdomen pelvis 1/12/2018

PET-CT 10/22/2017



RADIONUCLIDE AND DOSE:  11.5 mCi F18 FDG

The route of agent administration: Intravenous



FASTING BLOOD SUGAR:  84 mg/dl



CONTRAST TYPE AND DOSE:  No CT contrast given.



TECHNIQUE:  Blood glucose level was verified.  Above dose of FDG was injected intravenously.  2-D seg
mented attenuation correction images were obtained from the base of the skull to the midthighs.  Nonc
ontrast CT images were obtained for attenuation correction and fusion with emission images.  CT image
s were performed without oral or intravenous contrast and are not sensitive for parenchymal lesions. 
 A series of overlapping emission PET images were obtained.  Images reviewed and manipulated at [facility] work station by the radiologist.  Images stored on PACS.



LIMITATIONS:  None.



FINDINGS:  HEAD AND NECK: No areas of abnormal metabolic activity in the soft tissues of the head and
 neck.

CHEST: There is a new left supraclavicular lymph node on axial image 43, 9 mm in size with SUV 3.3.

A 1.6 cm lymph node identified on CT chest abdomen pelvis 1/2/2018 is again seen, superior to the lef
t axilla surgical clips post axillary dissection.  This measures 1.8 x 1.3 cm in size on axial image 
50, with SUV of 6.5.

There is a second, adjacent lymph node left axilla 2.2 x 1.5 cm in size on axial image 54, with SUV 7
.8.

Patient is post bilateral mastectomy.  Along the left mastectomy incision, there is a band of increas
ed activity with SUV ranging from 7.6 to 8.3.

ABDOMEN AND PELVIS: No areas of abnormal metabolic activity in the abdomen or pelvis.  Expected physi
ologic activity is present in the genitourinary system and bowel.

PROXIMAL LOWER EXTREMITIES: No areas of abnormal metabolic activity in the soft tissues of the lower 
extremities.

BONES: No abnormal metabolic activity in the visualized skeleton.

ADDITIONAL CT FINDINGS: Right permanent central line tip superior vena cava.  Mucous membrane thicken
ing floor right maxillary sinus.  Post cholecystectomy

OTHER: Liver background SUV 2.3.  Blood pool background 1.5 SUV



IMPRESSION:  New left supraclavicular hypermetabolic lymph nodes.

Hypermetabolic left high axillary lymph node superior to the axillary dissection surgical clips.

Bandlike increased activity along the left mastectomy scar.



TECHNICAL DOCUMENTATION:  JOB ID:  [id number]

 2011 [employer]- All Rights Reserved

## 2018-02-22 ENCOUNTER — HOSPITAL ENCOUNTER (OUTPATIENT)
Dept: HOSPITAL 62 - OD | Age: 48
End: 2018-02-22
Attending: RADIOLOGY
Payer: COMMERCIAL

## 2018-02-22 DIAGNOSIS — Z79.899: ICD-10-CM

## 2018-02-22 DIAGNOSIS — C77.3: ICD-10-CM

## 2018-02-22 DIAGNOSIS — C50.412: Primary | ICD-10-CM

## 2018-02-22 LAB
ADD MANUAL DIFF: NO
BASOPHILS # BLD AUTO: 0 10^3/UL (ref 0–0.2)
BASOPHILS NFR BLD AUTO: 0.5 % (ref 0–2)
EOSINOPHIL # BLD AUTO: 0.1 10^3/UL (ref 0–0.6)
EOSINOPHIL NFR BLD AUTO: 1.7 % (ref 0–6)
ERYTHROCYTE [DISTWIDTH] IN BLOOD BY AUTOMATED COUNT: 15.9 % (ref 11.5–14)
HCT VFR BLD CALC: 36.2 % (ref 36–47)
HGB BLD-MCNC: 11.9 G/DL (ref 12–15.5)
LYMPHOCYTES # BLD AUTO: 0.7 10^3/UL (ref 0.5–4.7)
LYMPHOCYTES NFR BLD AUTO: 13.5 % (ref 13–45)
MCH RBC QN AUTO: 25.6 PG (ref 27–33.4)
MCHC RBC AUTO-ENTMCNC: 32.7 G/DL (ref 32–36)
MCV RBC AUTO: 78 FL (ref 80–97)
MONOCYTES # BLD AUTO: 0.3 10^3/UL (ref 0.1–1.4)
MONOCYTES NFR BLD AUTO: 5.8 % (ref 3–13)
NEUTROPHILS # BLD AUTO: 3.9 10^3/UL (ref 1.7–8.2)
NEUTS SEG NFR BLD AUTO: 78.5 % (ref 42–78)
PLATELET # BLD: 214 10^3/UL (ref 150–450)
RBC # BLD AUTO: 4.63 10^6/UL (ref 3.72–5.28)
TOTAL CELLS COUNTED % (AUTO): 100 %
WBC # BLD AUTO: 5 10^3/UL (ref 4–10.5)

## 2018-02-22 PROCEDURE — 85025 COMPLETE CBC W/AUTO DIFF WBC: CPT

## 2018-02-22 PROCEDURE — 36415 COLL VENOUS BLD VENIPUNCTURE: CPT

## 2018-03-12 ENCOUNTER — HOSPITAL ENCOUNTER (OUTPATIENT)
Dept: HOSPITAL 62 - OD | Age: 48
End: 2018-03-12
Attending: CLINIC/CENTER
Payer: COMMERCIAL

## 2018-03-12 DIAGNOSIS — R10.11: ICD-10-CM

## 2018-03-12 DIAGNOSIS — M54.89: Primary | ICD-10-CM

## 2018-03-12 LAB
ADD MANUAL DIFF: NO
ALBUMIN SERPL-MCNC: 4.1 G/DL (ref 3.5–5)
ALP SERPL-CCNC: 55 U/L (ref 38–126)
ALT SERPL-CCNC: 26 U/L (ref 9–52)
ANION GAP SERPL CALC-SCNC: 9 MMOL/L (ref 5–19)
AST SERPL-CCNC: 21 U/L (ref 14–36)
BASOPHILS # BLD AUTO: 0 10^3/UL (ref 0–0.2)
BASOPHILS NFR BLD AUTO: 0.5 % (ref 0–2)
BILIRUB DIRECT SERPL-MCNC: 0.4 MG/DL (ref 0–0.4)
BILIRUB SERPL-MCNC: 0.5 MG/DL (ref 0.2–1.3)
BUN SERPL-MCNC: 5 MG/DL (ref 7–20)
CALCIUM: 9.6 MG/DL (ref 8.4–10.2)
CHLORIDE SERPL-SCNC: 102 MMOL/L (ref 98–107)
CO2 SERPL-SCNC: 31 MMOL/L (ref 22–30)
EOSINOPHIL # BLD AUTO: 0.1 10^3/UL (ref 0–0.6)
EOSINOPHIL NFR BLD AUTO: 1.3 % (ref 0–6)
ERYTHROCYTE [DISTWIDTH] IN BLOOD BY AUTOMATED COUNT: 15.4 % (ref 11.5–14)
GLUCOSE SERPL-MCNC: 96 MG/DL (ref 75–110)
HCT VFR BLD CALC: 35.4 % (ref 36–47)
HGB BLD-MCNC: 11.6 G/DL (ref 12–15.5)
LIPASE SERPL-CCNC: 40.6 U/L (ref 23–300)
LYMPHOCYTES # BLD AUTO: 0.3 10^3/UL (ref 0.5–4.7)
LYMPHOCYTES NFR BLD AUTO: 7.4 % (ref 13–45)
MCH RBC QN AUTO: 25.4 PG (ref 27–33.4)
MCHC RBC AUTO-ENTMCNC: 32.8 G/DL (ref 32–36)
MCV RBC AUTO: 78 FL (ref 80–97)
MONOCYTES # BLD AUTO: 0.4 10^3/UL (ref 0.1–1.4)
MONOCYTES NFR BLD AUTO: 9 % (ref 3–13)
NEUTROPHILS # BLD AUTO: 3.6 10^3/UL (ref 1.7–8.2)
NEUTS SEG NFR BLD AUTO: 81.8 % (ref 42–78)
PLATELET # BLD: 236 10^3/UL (ref 150–450)
POTASSIUM SERPL-SCNC: 4 MMOL/L (ref 3.6–5)
PROT SERPL-MCNC: 7.3 G/DL (ref 6.3–8.2)
RBC # BLD AUTO: 4.57 10^6/UL (ref 3.72–5.28)
SODIUM SERPL-SCNC: 142.2 MMOL/L (ref 137–145)
TOTAL CELLS COUNTED % (AUTO): 100 %
WBC # BLD AUTO: 4.4 10^3/UL (ref 4–10.5)

## 2018-03-12 PROCEDURE — 80053 COMPREHEN METABOLIC PANEL: CPT

## 2018-03-12 PROCEDURE — 85025 COMPLETE CBC W/AUTO DIFF WBC: CPT

## 2018-03-12 PROCEDURE — 36415 COLL VENOUS BLD VENIPUNCTURE: CPT

## 2018-03-12 PROCEDURE — 83690 ASSAY OF LIPASE: CPT

## 2018-04-05 ENCOUNTER — HOSPITAL ENCOUNTER (OUTPATIENT)
Dept: HOSPITAL 62 - RAD | Age: 48
End: 2018-04-05
Attending: INTERNAL MEDICINE
Payer: COMMERCIAL

## 2018-04-05 VITALS — SYSTOLIC BLOOD PRESSURE: 98 MMHG | DIASTOLIC BLOOD PRESSURE: 69 MMHG

## 2018-04-05 DIAGNOSIS — J91.0: Primary | ICD-10-CM

## 2018-04-05 DIAGNOSIS — Z79.899: ICD-10-CM

## 2018-04-05 DIAGNOSIS — E03.9: ICD-10-CM

## 2018-04-05 DIAGNOSIS — Z88.5: ICD-10-CM

## 2018-04-05 DIAGNOSIS — C50.412: ICD-10-CM

## 2018-04-05 LAB
APTT BLD: 31.9 SEC (ref 23.5–35.8)
BUN SERPL-MCNC: 8 MG/DL (ref 7–20)
ERYTHROCYTE [DISTWIDTH] IN BLOOD BY AUTOMATED COUNT: 16.1 % (ref 11.5–14)
HCT VFR BLD CALC: 34.9 % (ref 36–47)
HGB BLD-MCNC: 11.2 G/DL (ref 12–15.5)
INR PPP: 0.95
MCH RBC QN AUTO: 24.9 PG (ref 27–33.4)
MCHC RBC AUTO-ENTMCNC: 32.1 G/DL (ref 32–36)
MCV RBC AUTO: 78 FL (ref 80–97)
PLATELET # BLD: 245 10^3/UL (ref 150–450)
PROTHROMBIN TIME: 13.4 SEC (ref 11.4–15.4)
RBC # BLD AUTO: 4.49 10^6/UL (ref 3.72–5.28)
WBC # BLD AUTO: 5.1 10^3/UL (ref 4–10.5)

## 2018-04-05 PROCEDURE — 36415 COLL VENOUS BLD VENIPUNCTURE: CPT

## 2018-04-05 PROCEDURE — 85610 PROTHROMBIN TIME: CPT

## 2018-04-05 PROCEDURE — 76604 US EXAM CHEST: CPT

## 2018-04-05 PROCEDURE — 84520 ASSAY OF UREA NITROGEN: CPT

## 2018-04-05 PROCEDURE — 85730 THROMBOPLASTIN TIME PARTIAL: CPT

## 2018-04-05 PROCEDURE — 85027 COMPLETE CBC AUTOMATED: CPT

## 2018-04-05 PROCEDURE — 82565 ASSAY OF CREATININE: CPT

## 2018-04-05 NOTE — RADIOLOGY REPORT (SQ)
EXAM DESCRIPTION:  U/S CHEST



COMPLETED DATE/TIME:  4/5/2018 11:10 am



REASON FOR STUDY:  PL EFF



COMPARISON:  PET-CT 1/21/2018



TECHNIQUE:  Ultrasound of the right and left chest was performed to evaluate for pleural effusions.



LIMITATIONS:  None.



FINDINGS:  Very small pleural effusions are present bilaterally in the posterior costophrenic sulci. 
 Effusions are not large enough to effectively tab under ultrasound guidance without significant risk
 of pneumothorax.

These findings were discussed with Dr. Sanz, thoracentesis was canceled today.



IMPRESSION:  Very small bilateral pleural effusions, no thoracentesis performed today.



TECHNICAL DOCUMENTATION:  JOB ID:  [id number]

 2011 [employer]- All Rights Reserved



Reading location - IP/workstation name: [email or web address]

## 2018-07-08 ENCOUNTER — HOSPITAL ENCOUNTER (OUTPATIENT)
Dept: HOSPITAL 62 - RAD | Age: 48
End: 2018-07-08
Attending: INTERNAL MEDICINE
Payer: OTHER GOVERNMENT

## 2018-07-08 DIAGNOSIS — Z90.13: ICD-10-CM

## 2018-07-08 DIAGNOSIS — C50.412: Primary | ICD-10-CM

## 2018-07-08 PROCEDURE — A9552 F18 FDG: HCPCS

## 2018-07-08 PROCEDURE — 78815 PET IMAGE W/CT SKULL-THIGH: CPT

## 2018-07-09 NOTE — RADIOLOGY REPORT (SQ)
EXAM DESCRIPTION:  PET CT SKULL/THIGH



COMPLETED DATE/TIME:  7/8/2018 6:14 pm



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



COMPARISON:  PET-CT 1/21/2018, 1/2/2018, 10/22/2017



RADIONUCLIDE AND DOSE:  10.9 mCi F18 FDG

The route of agent administration: Intravenous



FASTING BLOOD SUGAR:  87 mg/dl



CONTRAST TYPE AND DOSE:  No CT contrast given.



TECHNIQUE:  Blood glucose level was verified.  Above dose of FDG was injected intravenously.  2-D seg
mented attenuation correction images were obtained from the base of the skull to the midthighs.  Nonc
ontrast CT images were obtained for attenuation correction and fusion with emission images.  CT image
s were performed without oral or intravenous contrast and are not sensitive for parenchymal lesions. 
 A series of overlapping emission PET images were obtained.  Images reviewed and manipulated at [facility] work station by the radiologist.  Images stored on PACS.



LIMITATIONS:  None.



FINDINGS:  HEAD AND NECK: No areas of abnormal metabolic activity in the soft tissues of the head and
 neck.  The left supraclavicular lymph nodes seen on 1/21/2018 are no longer present.

CHEST: No areas of abnormal metabolic activity in the chest.  The deep left axillary/retropectoral ly
mph nodes seen on 1/21/2018 are no longer present.

There patchy left upper lobe airspace disease on axial images 48-57, with mild metabolic activity, GIBBONS
V at liver background of 2.4.

ABDOMEN AND PELVIS: No areas of abnormal metabolic activity in the abdomen or pelvis.  Expected physi
ologic activity is present in the genitourinary system and bowel.

PROXIMAL LOWER EXTREMITIES: No areas of abnormal metabolic activity in the soft tissues of the lower 
extremities.

BONES: No abnormal metabolic activity in the visualized skeleton.

ADDITIONAL CT FINDINGS: Bilateral mastectomy.  Post cholecystectomy.  Right permanent central line ti
p superior vena cava.

OTHER: Liver background activity 2.4 SUV.  Blood pool background activity 1.8 SUV.



IMPRESSION:  Minimal left upper lobe airspace disease.

Left supraclavicular and deep axillary/left retropectoral lymph nodes seen on 1/21/2018 are no longer
 present.



TECHNICAL DOCUMENTATION:  JOB ID:  [id number]

 2011 [employer]- All Rights Reserved



Reading location - IP/workstation name: [facility]

## 2018-10-14 ENCOUNTER — HOSPITAL ENCOUNTER (OUTPATIENT)
Dept: HOSPITAL 62 - RAD | Age: 48
End: 2018-10-14
Attending: INTERNAL MEDICINE
Payer: COMMERCIAL

## 2018-10-14 DIAGNOSIS — C50.412: Primary | ICD-10-CM

## 2018-10-14 PROCEDURE — 78815 PET IMAGE W/CT SKULL-THIGH: CPT

## 2018-10-14 PROCEDURE — A9552 F18 FDG: HCPCS

## 2018-10-15 NOTE — RADIOLOGY REPORT (SQ)
EXAM DESCRIPTION:  PET CT SKULL/THIGH



COMPLETED DATE/TIME:  10/14/2018 9:09 pm



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



COMPARISON:  PET-CT 7/8/2018, 1/21/2018, 10/22/2017

CT chest abdomen pelvis 1/2/2018



RADIONUCLIDE AND DOSE:  12.7 mCi F18 FDG

The route of agent administration: Intravenous



FASTING BLOOD SUGAR:  87 mg/dl



CONTRAST TYPE AND DOSE:  No CT contrast given.



TECHNIQUE:  Blood glucose level was verified.  Above dose of FDG was injected intravenously.  2-D seg
mented attenuation correction images were obtained from the base of the skull to the midthighs.  Nonc
ontrast CT images were obtained for attenuation correction and fusion with emission images.  CT image
s were performed without oral or intravenous contrast and are not sensitive for parenchymal lesions. 
 A series of overlapping emission PET images were obtained.  Images reviewed and manipulated at [facility] work station by the radiologist.  Images stored on PACS.



LIMITATIONS:  None.



FINDINGS:  HEAD AND NECK: No areas of abnormal metabolic activity in the soft tissues of the head and
 neck.

CHEST: Left supraclavicular and left axillary adenopathy seen on 1/21/2018 has been surgically remove
d.  There is bandlike scarring in the right lung apex post radiation therapy and multiple surgical cl
ips at the left high axilla/retropectoral region.  No increased uptake in this area worrisome for res
idual metastatic adenopathy.

ABDOMEN AND PELVIS: No areas of abnormal metabolic activity in the abdomen or pelvis.  Expected physi
ologic activity is present in the genitourinary system and bowel.

PROXIMAL LOWER EXTREMITIES: No areas of abnormal metabolic activity in the soft tissues of the lower 
extremities.

BONES: No abnormal metabolic activity in the visualized skeleton.

ADDITIONAL CT FINDINGS: Bilateral mastectomy.  Right permanent central line tip superior vena cava.  
Post cholecystectomy.  Mucus or serous retention cyst floor right maxillary sinus

OTHER: Liver background activity 2.5 SUV.  Blood pool background activity 2.0 SUV



IMPRESSION:  No PET-CT evidence of residual or recurrent tumor.



TECHNICAL DOCUMENTATION:  JOB ID:  [id number]

 2011 [employer]- All Rights Reserved



Reading location - IP/workstation name: [email or web address]

## 2019-01-13 ENCOUNTER — HOSPITAL ENCOUNTER (OUTPATIENT)
Dept: HOSPITAL 62 - RAD | Age: 49
End: 2019-01-13
Attending: INTERNAL MEDICINE
Payer: COMMERCIAL

## 2019-01-13 DIAGNOSIS — C50.412: Primary | ICD-10-CM

## 2019-01-13 PROCEDURE — A9552 F18 FDG: HCPCS

## 2019-01-13 PROCEDURE — 78815 PET IMAGE W/CT SKULL-THIGH: CPT

## 2019-01-15 NOTE — RADIOLOGY REPORT (SQ)
EXAM DESCRIPTION:  PET CT SKULL/THIGH



COMPLETED DATE/TIME:  1/13/2019 9:26 pm



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



COMPARISON:  PET-CT 10/14/2018, 7/8/2018, 1/21/2018, 10/22/2017

CT chest abdomen pelvis 1/2/2018



RADIONUCLIDE AND DOSE:  8.3 mCi F18 FDG

The route of agent administration: Intravenous



FASTING BLOOD SUGAR:  78 mg/dl



CONTRAST TYPE AND DOSE:  No CT contrast given.



TECHNIQUE:  Blood glucose level was verified.  Above dose of FDG was injected intravenously.  2-D seg
mented attenuation correction images were obtained from the base of the skull to the midthighs.  Nonc
ontrast CT images were obtained for attenuation correction and fusion with emission images.  CT image
s were performed without oral or intravenous contrast and are not sensitive for parenchymal lesions. 
 A series of overlapping emission PET images were obtained.  Images reviewed and manipulated at [facility] work station by the radiologist.  Images stored on PACS.



LIMITATIONS:  None.



FINDINGS:  HEAD AND NECK: No areas of abnormal metabolic activity in the soft tissues of the head and
 neck.

CHEST:  Multiple new areas of increased metabolic activity over the chest as follows:

1.5 x 1.1 cm right peritracheal lymph node axial image 73,  with SUV 6.8.

1.3 x 1.3 cm right peritracheal lymph node axial image 79, with  SUV 7.2.

Subcentimeter right hilar lymph node axial image 86, with SUV 7.1

3.3 x 2.4 cm sub- carinal lymph node axial image 87, with SUV 12.1

Left chest wall near the axillary surgical clips 1.2 cm diameter nodule axial image 83, SUV 6.1

Left chest wall along the mastectomy incision along the skin surface, SUV 5.4

Left flank 1.5 cm skin lesion axial image 120, SUV 10.9

There is a new nodule in the right posterior costophrenic sulcus, right lower lobe 13 mm size on axia
l image 114 with SUV 2.0 (between blood pool and liver activity

ABDOMEN AND PELVIS: No areas of abnormal metabolic activity in the abdomen or pelvis.  Expected physi
ologic activity is present in the genitourinary system and bowel.

PROXIMAL LOWER EXTREMITIES: No areas of abnormal metabolic activity in the soft tissues of the lower 
extremities.

BONES: No abnormal metabolic activity in the visualized skeleton.

ADDITIONAL CT FINDINGS: Bilateral mastectomies with breast implants.  Right central line tip in the s
uperior vena cava.  Post cholecystectomy.  Mild cardiomegaly

OTHER: Liver background activity 2.8 SUV.  Blood pool background activity 1.8 SUV



IMPRESSION:  Tumor recurrence over the left chest wall, and mediastinum as above



TECHNICAL DOCUMENTATION:  JOB ID:  [id number]

 2011 [employer]- All Rights Reserved



Reading location - IP/workstation name: [email or web address]

## 2019-01-28 ENCOUNTER — HOSPITAL ENCOUNTER (OUTPATIENT)
Dept: HOSPITAL 62 - RAD | Age: 49
End: 2019-01-28
Attending: INTERNAL MEDICINE
Payer: COMMERCIAL

## 2019-01-28 DIAGNOSIS — C50.412: Primary | ICD-10-CM

## 2019-01-28 DIAGNOSIS — R26.81: ICD-10-CM

## 2019-01-28 DIAGNOSIS — R26.89: ICD-10-CM

## 2019-01-28 PROCEDURE — 70553 MRI BRAIN STEM W/O & W/DYE: CPT

## 2019-01-28 PROCEDURE — A9576 INJ PROHANCE MULTIPACK: HCPCS

## 2019-01-28 PROCEDURE — 82565 ASSAY OF CREATININE: CPT

## 2019-04-04 ENCOUNTER — HOSPITAL ENCOUNTER (INPATIENT)
Dept: HOSPITAL 62 - ER | Age: 49
Discharge: LEFT BEFORE DISCHARGE | DRG: 871 | End: 2019-04-04
Attending: INTERNAL MEDICINE | Admitting: INTERNAL MEDICINE
Payer: COMMERCIAL

## 2019-04-04 VITALS — DIASTOLIC BLOOD PRESSURE: 34 MMHG | SYSTOLIC BLOOD PRESSURE: 179 MMHG

## 2019-04-04 DIAGNOSIS — E87.6: ICD-10-CM

## 2019-04-04 DIAGNOSIS — R50.81: ICD-10-CM

## 2019-04-04 DIAGNOSIS — D70.9: ICD-10-CM

## 2019-04-04 DIAGNOSIS — Z90.13: ICD-10-CM

## 2019-04-04 DIAGNOSIS — E87.2: ICD-10-CM

## 2019-04-04 DIAGNOSIS — L59.8: ICD-10-CM

## 2019-04-04 DIAGNOSIS — C78.2: ICD-10-CM

## 2019-04-04 DIAGNOSIS — Z17.1: ICD-10-CM

## 2019-04-04 DIAGNOSIS — E03.9: ICD-10-CM

## 2019-04-04 DIAGNOSIS — C79.31: ICD-10-CM

## 2019-04-04 DIAGNOSIS — C50.412: ICD-10-CM

## 2019-04-04 DIAGNOSIS — A41.9: Primary | ICD-10-CM

## 2019-04-04 DIAGNOSIS — C79.89: ICD-10-CM

## 2019-04-04 DIAGNOSIS — C34.90: ICD-10-CM

## 2019-04-04 DIAGNOSIS — J96.21: ICD-10-CM

## 2019-04-04 DIAGNOSIS — D69.6: ICD-10-CM

## 2019-04-04 DIAGNOSIS — Z99.81: ICD-10-CM

## 2019-04-04 DIAGNOSIS — R00.0: ICD-10-CM

## 2019-04-04 DIAGNOSIS — J18.1: ICD-10-CM

## 2019-04-04 DIAGNOSIS — G47.30: ICD-10-CM

## 2019-04-04 DIAGNOSIS — C78.7: ICD-10-CM

## 2019-04-04 LAB
ABSOLUTE LYMPHOCYTES# (MANUAL): 0.2 10^3/UL (ref 0.5–4.7)
ABSOLUTE MONOCYTES # (MANUAL): 0.1 10^3/UL (ref 0.1–1.4)
ABSOLUTE NEUTROPHILS# (MANUAL): 0.4 10^3/UL (ref 1.7–8.2)
ADD MANUAL DIFF: YES
ALBUMIN SERPL-MCNC: 3.2 G/DL (ref 3.5–5)
ALP SERPL-CCNC: 104 U/L (ref 38–126)
ALT SERPL-CCNC: 37 U/L (ref 9–52)
ANION GAP SERPL CALC-SCNC: 13 MMOL/L (ref 5–19)
ANISOCYTOSIS BLD QL SMEAR: SLIGHT
APPEARANCE UR: (no result)
APTT PPP: YELLOW S
ARTERIAL BLOOD FIO2: (no result)
ARTERIAL BLOOD FIO2: (no result)
ARTERIAL BLOOD H2CO3: 1.09 MMOL/L (ref 1.05–1.35)
ARTERIAL BLOOD H2CO3: 1.15 MMOL/L (ref 1.05–1.35)
ARTERIAL BLOOD HCO3: 27.9 MMOL/L (ref 20–24)
ARTERIAL BLOOD HCO3: 31.3 MMOL/L (ref 20–24)
ARTERIAL BLOOD PCO2: 36.1 MMHG (ref 35–45)
ARTERIAL BLOOD PCO2: 38.2 MMHG (ref 35–45)
ARTERIAL BLOOD PH: 7.48 (ref 7.35–7.45)
ARTERIAL BLOOD PH: 7.56 (ref 7.35–7.45)
ARTERIAL BLOOD PO2: 47.8 MMHG (ref 80–100)
ARTERIAL BLOOD PO2: 53.2 MMHG (ref 80–100)
ARTERIAL BLOOD TOTAL CO2: 29 MMOL/L (ref 21–25)
ARTERIAL BLOOD TOTAL CO2: 32.4 MMOL/L (ref 21–25)
AST SERPL-CCNC: 86 U/L (ref 14–36)
BARBITURATES UR QL SCN: NEGATIVE
BASE EXCESS BLDA CALC-SCNC: 4.3 MMOL/L
BASE EXCESS BLDA CALC-SCNC: 8.7 MMOL/L
BASOPHILS NFR BLD MANUAL: 0 % (ref 0–2)
BILIRUB DIRECT SERPL-MCNC: 0.5 MG/DL (ref 0–0.4)
BILIRUB SERPL-MCNC: 1 MG/DL (ref 0.2–1.3)
BILIRUB UR QL STRIP: NEGATIVE
BUN SERPL-MCNC: 13 MG/DL (ref 7–20)
CALCIUM: 8.7 MG/DL (ref 8.4–10.2)
CHLORIDE SERPL-SCNC: 88 MMOL/L (ref 98–107)
CO2 SERPL-SCNC: 32 MMOL/L (ref 22–30)
EOSINOPHIL NFR BLD MANUAL: 0 % (ref 0–6)
ERYTHROCYTE [DISTWIDTH] IN BLOOD BY AUTOMATED COUNT: 14.3 % (ref 11.5–14)
GLUCOSE SERPL-MCNC: 205 MG/DL (ref 75–110)
GLUCOSE UR STRIP-MCNC: NEGATIVE MG/DL
HCT VFR BLD CALC: 38.5 % (ref 36–47)
HGB BLD-MCNC: 12.8 G/DL (ref 12–15.5)
KETONES UR STRIP-MCNC: NEGATIVE MG/DL
MCH RBC QN AUTO: 29.6 PG (ref 27–33.4)
MCHC RBC AUTO-ENTMCNC: 33.3 G/DL (ref 32–36)
MCV RBC AUTO: 89 FL (ref 80–97)
METHADONE UR QL SCN: NEGATIVE
MONOCYTES % (MANUAL): 12 % (ref 3–13)
NEUTS BAND NFR BLD MANUAL: 12 % (ref 3–5)
NITRITE UR QL STRIP: NEGATIVE
NRBC BLD AUTO-RTO: 10 /100 WBC
NT PRO BNP: 2440 PG/ML (ref 5–900)
PATH REV BLD -IMP: (no result)
PCP UR QL SCN: NEGATIVE
PH UR STRIP: 6 [PH] (ref 5–9)
PLATELET # BLD: 74 10^3/UL (ref 150–450)
PLATELET COMMENT: (no result)
POTASSIUM SERPL-SCNC: 3 MMOL/L (ref 3.6–5)
PROT SERPL-MCNC: 6.1 G/DL (ref 6.3–8.2)
PROT UR STRIP-MCNC: 100 MG/DL
RBC # BLD AUTO: 4.33 10^6/UL (ref 3.72–5.28)
SAO2 % BLDA: 88.9 % (ref 94–98)
SAO2 % BLDA: 89.9 % (ref 94–98)
SEGMENTED NEUTROPHILS % (MAN): 48 % (ref 42–78)
SODIUM SERPL-SCNC: 133.3 MMOL/L (ref 137–145)
SP GR UR STRIP: 1.02
TOTAL CELLS COUNTED BLD: 50
TOXIC GRANULES BLD QL SMEAR: SLIGHT
TROPONIN I SERPL-MCNC: 0.19 NG/ML
URINE AMPHETAMINES SCREEN: NEGATIVE
URINE BENZODIAZEPINES SCREEN: NEGATIVE
URINE COCAINE SCREEN: NEGATIVE
URINE MARIJUANA (THC) SCREEN: NEGATIVE
UROBILINOGEN UR-MCNC: 2 MG/DL (ref ?–2)
VARIANT LYMPHS NFR BLD MANUAL: 28 % (ref 13–45)
WBC # BLD AUTO: 0.7 10^3/UL (ref 4–10.5)

## 2019-04-04 PROCEDURE — 81001 URINALYSIS AUTO W/SCOPE: CPT

## 2019-04-04 PROCEDURE — 96368 THER/DIAG CONCURRENT INF: CPT

## 2019-04-04 PROCEDURE — 96365 THER/PROPH/DIAG IV INF INIT: CPT

## 2019-04-04 PROCEDURE — 83880 ASSAY OF NATRIURETIC PEPTIDE: CPT

## 2019-04-04 PROCEDURE — 36600 WITHDRAWAL OF ARTERIAL BLOOD: CPT

## 2019-04-04 PROCEDURE — 96367 TX/PROPH/DG ADDL SEQ IV INF: CPT

## 2019-04-04 PROCEDURE — 51702 INSERT TEMP BLADDER CATH: CPT

## 2019-04-04 PROCEDURE — 36415 COLL VENOUS BLD VENIPUNCTURE: CPT

## 2019-04-04 PROCEDURE — 83605 ASSAY OF LACTIC ACID: CPT

## 2019-04-04 PROCEDURE — 93010 ELECTROCARDIOGRAM REPORT: CPT

## 2019-04-04 PROCEDURE — 96366 THER/PROPH/DIAG IV INF ADDON: CPT

## 2019-04-04 PROCEDURE — 85025 COMPLETE CBC W/AUTO DIFF WBC: CPT

## 2019-04-04 PROCEDURE — 87040 BLOOD CULTURE FOR BACTERIA: CPT

## 2019-04-04 PROCEDURE — 93005 ELECTROCARDIOGRAM TRACING: CPT

## 2019-04-04 PROCEDURE — 36591 DRAW BLOOD OFF VENOUS DEVICE: CPT

## 2019-04-04 PROCEDURE — 82803 BLOOD GASES ANY COMBINATION: CPT

## 2019-04-04 PROCEDURE — 87086 URINE CULTURE/COLONY COUNT: CPT

## 2019-04-04 PROCEDURE — 80307 DRUG TEST PRSMV CHEM ANLYZR: CPT

## 2019-04-04 PROCEDURE — 84484 ASSAY OF TROPONIN QUANT: CPT

## 2019-04-04 PROCEDURE — 99291 CRITICAL CARE FIRST HOUR: CPT

## 2019-04-04 PROCEDURE — 94660 CPAP INITIATION&MGMT: CPT

## 2019-04-04 PROCEDURE — 80053 COMPREHEN METABOLIC PANEL: CPT

## 2019-04-04 PROCEDURE — 71045 X-RAY EXAM CHEST 1 VIEW: CPT

## 2019-04-04 RX ADMIN — GUAIFENESIN SCH: 600 TABLET, EXTENDED RELEASE ORAL at 10:43

## 2019-04-04 RX ADMIN — POTASSIUM CHLORIDE SCH: 29.8 INJECTION, SOLUTION INTRAVENOUS at 17:36

## 2019-04-04 RX ADMIN — Medication SCH: at 21:10

## 2019-04-04 RX ADMIN — Medication PRN MLS/HR: at 05:21

## 2019-04-04 RX ADMIN — GUAIFENESIN SCH: 600 TABLET, EXTENDED RELEASE ORAL at 21:10

## 2019-04-04 RX ADMIN — Medication SCH: at 13:23

## 2019-04-04 RX ADMIN — POTASSIUM CHLORIDE SCH MLS/HR: 29.8 INJECTION, SOLUTION INTRAVENOUS at 15:05

## 2019-04-04 RX ADMIN — CEFEPIME HYDROCHLORIDE SCH: 2 INJECTION, SOLUTION INTRAVENOUS at 21:09

## 2019-04-04 RX ADMIN — POTASSIUM CHLORIDE SCH: 29.8 INJECTION, SOLUTION INTRAVENOUS at 21:08

## 2019-04-04 RX ADMIN — CEFEPIME HYDROCHLORIDE SCH MLS/HR: 2 INJECTION, SOLUTION INTRAVENOUS at 15:04

## 2019-04-04 RX ADMIN — Medication PRN MLS/HR: at 13:31

## 2019-04-04 NOTE — PDOC PROGRESS REPORT
Subjective


Progress Note for:: 04/04/19


Subjective:: 





I was called to see the patient.  She is not responding to aggressive therapy 

and remains extremely tachypneic with tachycardia and hypoxia.  She is not 

responding to BiPAP with an FiO2 of 100%.


Dr. Monaco did see the patient after I saw her for the admission.  He had a 

long talk with the family.  The patient in fact asks about her life expectancy 

and prognosis.  Based on that discussion the patient is now DO NOT 

RESUSCITATE/DO NOT INTUBATE.  Unfortunately we are not making any headway with 

our aggressive regimen.


Reason For Visit: 


PNEUMONIA,SEPSIS,NEUTROPENIA,THROMBOCYTOPENIA,








Physical Exam


Vital Signs: 


                                        











Temp Pulse Resp BP Pulse Ox


 


 104.0 F H  136 H  45 H  179/34 H  78 L


 


 04/04/19 16:00  04/04/19 16:00  04/04/19 16:39  04/04/19 16:00  04/04/19 16:00








                                 Intake & Output











 04/03/19 04/04/19 04/05/19





 06:59 06:59 06:59


 


Intake Total  2 2046


 


Output Total   225


 


Balance  2 1821


 


Weight  99.79 kg 98.1 kg











General appearance: PRESENT: severe distress


Respiratory exam: PRESENT: accessory muscle use, rhonchi - On the right.  

Slightly more prominent., tachypnea.  ABSENT: rales, wheezes


Cardiovascular exam: PRESENT: tachycardia - Marked tachycardia with decreased 

heart sounds


GI/Abdominal exam: PRESENT: hypoactive bowel sounds, soft.  ABSENT: distended, 

guarding, tenderness


Rectal exam: PRESENT: deferred


Neurological exam: PRESENT: alert, awake, oriented to person, oriented to place,

oriented to situation


Psychiatric exam: PRESENT: appropriate affect - Affect reflects her current 

clinical condition.  ABSENT: agitated, anxious





Results


Laboratory Results: 


                                        





                                 04/04/19 04:12 





                                 04/04/19 04:12 





                                        











  04/04/19 04/04/19 04/04/19





  04:09 04:12 04:12


 


WBC    0.7 L*


 


RBC    4.33


 


Hgb    12.8


 


Hct    38.5


 


MCV    89


 


MCH    29.6


 


MCHC    33.3


 


RDW    14.3 H


 


Plt Count    74 L


 


Seg Neutrophils %    Not Reportable


 


Lymphocytes %    Not Reportable


 


Monocytes %    Not Reportable


 


Eosinophils %    Not Reportable


 


Basophils %    Not Reportable


 


Absolute Neutrophils    Not Reportable


 


Absolute Lymphocytes    Not Reportable


 


Absolute Monocytes    Not Reportable


 


Absolute Eosinophils    Not Reportable


 


Absolute Basophils    Not Reportable


 


Carbonic Acid  1.15  


 


HCO3/H2CO3 Ratio  24:1  


 


ABG pH  7.48 H  


 


ABG pCO2  38.2  


 


ABG pO2  53.2 L  


 


ABG HCO3  27.9 H  


 


ABG O2 Saturation  89.9 L  


 


ABG Base Excess  4.3  


 


FiO2  100%  


 


Sodium   


 


Potassium   


 


Chloride   


 


Carbon Dioxide   


 


Anion Gap   


 


BUN   


 


Creatinine   


 


Est GFR ( Amer)   


 


Est GFR (Non-Af Amer)   


 


Glucose   


 


Lactic Acid   6.4 H 


 


Calcium   


 


Total Bilirubin   


 


AST   


 


ALT   


 


Alkaline Phosphatase   


 


Total Protein   


 


Albumin   


 


Urine Color   


 


Urine Appearance   


 


Urine pH   


 


Ur Specific Gravity   


 


Urine Protein   


 


Urine Glucose (UA)   


 


Urine Ketones   


 


Urine Blood   


 


Urine Nitrite   


 


Ur Leukocyte Esterase   


 


Urine WBC (Auto)   


 


Urine RBC (Auto)   














  04/04/19 04/04/19 04/04/19





  04:12 07:55 08:50


 


WBC   


 


RBC   


 


Hgb   


 


Hct   


 


MCV   


 


MCH   


 


MCHC   


 


RDW   


 


Plt Count   


 


Seg Neutrophils %   


 


Lymphocytes %   


 


Monocytes %   


 


Eosinophils %   


 


Basophils %   


 


Absolute Neutrophils   


 


Absolute Lymphocytes   


 


Absolute Monocytes   


 


Absolute Eosinophils   


 


Absolute Basophils   


 


Carbonic Acid   


 


HCO3/H2CO3 Ratio   


 


ABG pH   


 


ABG pCO2   


 


ABG pO2   


 


ABG HCO3   


 


ABG O2 Saturation   


 


ABG Base Excess   


 


FiO2   


 


Sodium  133.3 L  


 


Potassium  3.0 L*  


 


Chloride  88 L  


 


Carbon Dioxide  32 H  


 


Anion Gap  13  


 


BUN  13  


 


Creatinine  0.72  


 


Est GFR ( Amer)  > 60  


 


Est GFR (Non-Af Amer)  > 60  


 


Glucose  205 H  


 


Lactic Acid   2.7 H 


 


Calcium  8.7  


 


Total Bilirubin  1.0  


 


AST  86 H  


 


ALT  37  


 


Alkaline Phosphatase  104  


 


Total Protein  6.1 L  


 


Albumin  3.2 L  


 


Urine Color    YELLOW


 


Urine Appearance    SLIGHTLY-CLOUDY


 


Urine pH    6.0


 


Ur Specific Gravity    1.023


 


Urine Protein    100 H


 


Urine Glucose (UA)    NEGATIVE


 


Urine Ketones    NEGATIVE


 


Urine Blood    NEGATIVE


 


Urine Nitrite    NEGATIVE


 


Ur Leukocyte Esterase    NEGATIVE


 


Urine WBC (Auto)    27


 


Urine RBC (Auto)    18














  04/04/19





  10:10


 


WBC 


 


RBC 


 


Hgb 


 


Hct 


 


MCV 


 


MCH 


 


MCHC 


 


RDW 


 


Plt Count 


 


Seg Neutrophils % 


 


Lymphocytes % 


 


Monocytes % 


 


Eosinophils % 


 


Basophils % 


 


Absolute Neutrophils 


 


Absolute Lymphocytes 


 


Absolute Monocytes 


 


Absolute Eosinophils 


 


Absolute Basophils 


 


Carbonic Acid  1.09


 


HCO3/H2CO3 Ratio  28:1


 


ABG pH  7.56 H


 


ABG pCO2  36.1


 


ABG pO2  47.8 L


 


ABG HCO3  31.3 H


 


ABG O2 Saturation  88.9 L


 


ABG Base Excess  8.7


 


FiO2  100%


 


Sodium 


 


Potassium 


 


Chloride 


 


Carbon Dioxide 


 


Anion Gap 


 


BUN 


 


Creatinine 


 


Est GFR ( Amer) 


 


Est GFR (Non-Af Amer) 


 


Glucose 


 


Lactic Acid 


 


Calcium 


 


Total Bilirubin 


 


AST 


 


ALT 


 


Alkaline Phosphatase 


 


Total Protein 


 


Albumin 


 


Urine Color 


 


Urine Appearance 


 


Urine pH 


 


Ur Specific Gravity 


 


Urine Protein 


 


Urine Glucose (UA) 


 


Urine Ketones 


 


Urine Blood 


 


Urine Nitrite 


 


Ur Leukocyte Esterase 


 


Urine WBC (Auto) 


 


Urine RBC (Auto) 








                                        











  04/04/19





  04:12


 


Troponin I  0.192


 


NT-Pro-B Natriuret Pep  2440 H











Impressions: 


                                        





Chest X-Ray  04/04/19 04:08


IMPRESSION: Developing bilateral edema and/or pneumonia. 


 














Assessment and Plan





- Diagnosis


(1) Sepsis


Qualifiers: 


   Sepsis type: sepsis due to unspecified organism   Qualified Code(s): A41.9 - 

Sepsis, unspecified organism   


Is this a current diagnosis for this admission?: Yes   


Plan: 


Patient has a large right sided pneumonia.  She is severely neutropenic from her

chemotherapy.  There was no reported nausea and vomiting however aspiration 

could occur.  With her severe neutropenia opportunistic bacteria are also a 

consideration.  Because this is a bacterial pneumonia until proven otherwise the

patient was started on vancomycin and cefepime.


She did have hypotension that responded to IV fluids.  Marked tachycardia 

requiring diltiazem infusion.  Significantly increased work of breathing with 

severe hypoxia.  On BiPAP with an FiO2 of 100% there was difficulty keeping the 

patient's oxygen saturation above 90%.  She was febrile with a temperature above

102 F.


Afternoon encounter:


Despite fluids patient is still markedly tachycardic and tachypneic.  Her lactic

acid level has improved.  See discussion below.








(2) Acute respiratory failure with hypoxia


Is this a current diagnosis for this admission?: Yes   


Plan: 


Despite BiPAP the oxygen saturation was marginal even with an FiO2 of 100%.  He 

did have a long discussion with the patient and his wife about intubation in the

current setting.  Her degree of illness with underlying history could make it 

difficult to achieve extubation.  The patient and his wife would like to remain 

full CODE STATUS at this time.  The patient is going to be admitted to the ICU 

and there is a high probability of intubation.








(3) Pneumonia


Qualifiers: 


   Pneumonia type: due to unspecified organism   Laterality: bilateral   Lung 

location: lower lobe of lung   Qualified Code(s): J18.1 - Lobar pneumonia, 

unspecified organism   


Is this a current diagnosis for this admission?: Yes   


Plan: 


Considering the patient's severe neutropenia this is a bacterial pneumonia until

proven otherwise.  It could be community-acquired bacteria such as Streptococcus

versus opportunistic organisms including oral uche from silent aspiration.  The

patient will be placed on vancomycin and cefepime.  Nebulizer treatments will be

available as needed.








(4) Elevated troponin I level


Is this a current diagnosis for this admission?: Yes   


Plan: 


The EKG demonstrated an inferior infarct of undetermined age.  The patient does 

not recall a myocardial infarction.  The troponin is elevated at 0.19.  We will 

perform serial troponins as this may help in selection of medications to treat 

the patient.  She is on diltiazem infusion for the marked tachycardia.








(5) Hypotension


Qualifiers: 


   Hypotension type: other hypotension type   Qualified Code(s): I95.89 - Other 

hypotension   


Is this a current diagnosis for this admission?: Yes   


Plan: 


Hypotension due to sepsis and possible hypovolemia.  The patient did respond to 

IV fluids but she is a full code and vasopressors will be instituted if we are 

not able to maintain reasonable blood pressure.  Hopefully slowing the heart 

rate will also improve her underlying blood pressure.








(6) Neutropenic fever


Is this a current diagnosis for this admission?: Yes   


Plan: 


Marked neutropenia with a white blood cell count of 0.4.  She will be on 

neutropenic precautions including neutropenic diet.  Tylenol has not been 

terribly effective.  I have ordered ibuprofen to be available as well.  She may 

need a cooling blanket.








(7) Lactic acidemia


Is this a current diagnosis for this admission?: Yes   


Plan: 


Repeat lactic acid was down to 2.7








(8) Tachycardia


Is this a current diagnosis for this admission?: Yes   


Plan: 


No improvement on diltiazem infusion








(9) Thrombocytopenia


Is this a current diagnosis for this admission?: Yes   





(10) Breast cancer metastasized to lung


Qualifiers: 


   Laterality: left   Qualified Code(s): C50.912 - Malignant neoplasm of 

unspecified site of left female breast; C78.02 - Secondary malignant neoplasm of

left lung   


Is this a current diagnosis for this admission?: Yes   





(11) Hypokalemia


Is this a current diagnosis for this admission?: Yes   





- Time


Total Critical Time (Minutes): 40


Medications reviewed and adjusted accordingly: Yes





- Plan Summary


Plan Summary: 


I was called to see the patient as she was not improving and in fact slightly 

deteriorating.  She was not responding to antipyretic medications.  It was 

difficult to keep her pulse ox readings above 90 and she remained very 

tachycardic.  I reviewed with the patient the next level of care which would be 

comfort only.  I explained how we would specifically dedicated her treatment 

regimen to comfort only.  We would remove all of the oxygen and intravenous 

medications.  As sick as she is I felt her life expectancy would be less than 24

hours.  She clearly understood that she was not responding to the aggressive 

regimen.  I told her there was no rush to make a change in her therapy but I 

wanted her to be aware of the options.  At the end of the conversation the 

patient in fact elected comfort care only.  I explained to the patient that I 

would feel more comfortable for her  to return and have the discussion 

once again with the  present.


The ICU staff called me when the  will is back at the bedside.  I 

reviewed the entire conversation with the patient's  at the bedside.  The

wife confirmed that she wanted comfort measures.  The only caveat was that there

went to try and see if the children could make it to the hospital before 

instituting comfort measures.  I absolutely reassured them that we would not 

make any changes but that her condition could deteriorate regardless.  The 

patient's  is going to begin making phone calls and I reported to the 

staff that we would keep our current plan in place.

## 2019-04-04 NOTE — ER DOCUMENT REPORT
ED General





- General


Chief Complaint: Shortness Of Breath


Stated Complaint: POSS PNEUMONIA


Time Seen by Provider: 04/04/19 03:56


Primary Care Provider: 


VERONICA THOMSON MD [Primary Care Provider] - Follow up as needed


Notes: 





Patient is a pleasant 49-year-old female with a history of stage IV lung cancer.

 She is referred in by Dr. Monaco due to worsening difficulty breathing.  

Patient is on home oxygen but was had worsening difficulty breathing over last 

24 hours.  She had fevers last couple days.  She is on chemotherapy.  Said 

breathing became much worse tonight and therefore she came to ER.  Patient does 

have a previous history of pleural effusion but this resolved on its own.  She 

does have history of pneumonia in the past as well.  No history of MI.  She 

denies any chest pain.


TRAVEL OUTSIDE OF THE [place] IN LAST 30 DAYS: No





- Related Data


Allergies/Adverse Reactions: 


                                        





hydrocodone Adverse Reaction (Verified 04/04/19 04:26)


   Generalized Itching











Past Medical History





- Social History


Smoking Status: Unknown if Ever Smoked


Frequency of alcohol use: None


Drug Abuse: None


Family History: Hypertension





- Past Medical History


Cardiac Medical History: Reports: Hx Heart Attack - EKG SHOWS OLD; PT UNAWARE


   Denies: Hx Coronary Artery Disease, Hx Hypertension


Pulmonary Medical History: Reports: Hx Sleep Apnea


   Denies: Hx Asthma, Hx Bronchitis, Hx COPD, Hx Pneumonia


Neurological Medical History: Denies: Hx Cerebrovascular Accident, Hx Seizures


Endocrine Medical History: Reports: Hx Hypothyroidism


Renal/ Medical History: Denies: Hx Peritoneal Dialysis


Malignancy Medical History: Reports: Hx Breast Cancer - left breast cancer 

metastatic to the axilla status post neoadjuvant therapy


Musculoskeletal Medical History: Reports Hx Arthritis - hands and knees BACK


Psychiatric Medical History: Reports: Hx Depression


Past Surgical History: Reports: Hx Orthopedic Surgery - R SHOULDER REPLACEMENT, 

Hx Thyroid Surgery, Hx Tubal Ligation





- Immunizations


Hx Diphtheria, Pertussis, Tetanus Vaccination: Yes





Review of Systems





- Review of Systems


Notes: 





My Normal Review Basic





REVIEW OF SYSTEMS:


CONSTITUTIONAL : Recent fevers


EENT:   Denies eye, ear, throat, or mouth pain or symptoms.  Denies nasal or 

sinus congestion.


CARDIOVASCULAR:  Denies chest pain.


RESPIRATORY: Dyspnea


GASTROINTESTINAL:  Denies abdominal pain.  Denies nausea, vomiting, or diarrhea.




MUSCULOSKELETAL:  Denies neck or back pain or joint pain or swelling.


Hematology: Leukopenia due to chemotherapy


SKIN:   Denies rash or skin lesions.


NEUROLOGICAL:  Denies altered mental status or loss of consciousness.  Denies 

headache.  Denies weakness or paralysis or loss of use of either side.  Denies 

problems with gait or speech.  Denies sensory or motor loss.


ALL OTHER SYSTEMS REVIEWED AND NEGATIVE.





Physical Exam





- Vital signs


Vitals: 


                                        











Temp Pulse Resp BP Pulse Ox


 


 97.9 F   146 H  22 H  139/77 H  68 L


 


 04/04/19 03:50  04/04/19 03:50  04/04/19 03:50  04/04/19 03:50  04/04/19 03:50














- Notes


Notes: 





General Appearance: Well nourished, alert, cooperative, moderate to severe acute

distress, no obvious discomfort.  Patient currently tripoding working hard to 

breathe.


Vitals: reviewed, See vital signs table.


Head: no swelling or tenderness to the head


Eyes: PERRL, EOMI, Conjuctiva clear


Mouth: No decreasd moisture


Throat: No tonsillar inflammation, No airway obstruction,  No lymphadenopathy


Neck: Supple, no neck tenderness


Chest wall: Patient has skin breakdown from previous mastectomy in the left 

chest wall.


Lungs: No wheezing, No rales, No rhonci, moderate accessory muscle use, good air

exchange bilaterally.


Heart: Tachycardic rate, Regular rythm, No murmur, no rub


Abdomen: Normal BS, soft, No rigidity, No abdominal tenderness, No guarding, no 

rebound, no abdominal masses


Extremities: strength 5/5 in all extremities, good pulses in all extremities, no

swelling or tenderness in the extremities, no edema.


Skin: warm, dry, appropriate color, no rash


Neuro: speech clear, oriented x 3, normal affect, responds appropriately to 

questions.





Course





- Re-evaluation


Re-evalutation: 





04/04/19 05:11


Patient is feeling much improved on the BiPAP.  She is however having recurrent 

episodes where her heart rate will get into the 190s.  Will stay there for 

approximately 10-30 seconds.  After that it returns back to the 130s 140s.  

Being that her heart rate is currently going to what appears to be SVT I will 

place her on Cardizem drip to help prevent this from reoccurring.


04/04/19 06:27





Because the patient's chest x-ray did place her on broad-spectrum antibiotics.  

This before received results of her CBC.  I placed on Zosyn and vancomycin.  

Later her CBC come back showing that she is severely neutropenic.  Despite being

severely neutropenic she actually has a bandemia.  At this time I suspect she 

most likely has pneumonia.  Chest x-ray findings could also be consistent with 

that of congestive heart failure.  I therefore am very careful about how much 

fluid she is receiving.  Her troponin is elevated.  I suspect this likely is 

related to hypoxemia she arrived with O2 saturation in the 60s.  I explained to 

the family that this could also be related to her potentially having heart attac

k causing her symptoms and findings.  I informed him that treatment for this if 

it is of cardiac etiology would be a heart catheterization.  I explained the 

procedure of heart cath to them.  I explained this to both the  and wife.

 At this time they feel that she is going through enough and they do not want 

this type of intervention.  They said they do not want to be transferred and 

would prefer to medically manage and avoid heart catheterization at this time.  

I did call and speak with the patient's oncologist, Dr. Monaco, who agrees with

the plan of starting patient antibiotics.  He said that when the next dose of 

Zosyn is due that he would prefer it be switched to cefepime 2 g.  He agrees 

with the vancomycin.  Patient's heart rate has been more steady on the Cardizem 

drip.  Is staying in the 120s-130s.  Is no longer going into the 180s 190s.  

Blood pressures remained stable.  Oxygen saturation is in the low to mid 90s.


04/04/19 08:03








She has spiked fever.  We did give her rectal Tylenol.  O2 saturations remained 

in the low 90s.  Her work of breathing is much improved.  I did readdress CODE 

STATUS with the patient and she wants to remain full code.  I did speak with Dr. Walton who agrees to evaluate the patient for admission.





Dictation of this chart was performed using voice recognition software; 

therefore, there may be some unintended grammatical errors.





- Vital Signs


Vital signs: 


                                        











Temp Pulse Resp BP Pulse Ox


 


 102.8 F H  146 H  36 H  146/91 H  91 L


 


 04/04/19 07:33  04/04/19 03:50  04/04/19 07:50  04/04/19 07:46  04/04/19 07:50














- Laboratory


Result Diagrams: 


                                 04/04/19 04:12





                                 04/04/19 04:12


Laboratory results interpreted by me: 


                                        











  04/04/19 04/04/19 04/04/19





  04:09 04:12 04:12


 


WBC    0.7 L*


 


RDW    14.3 H


 


Plt Count    74 L


 


Band Neutrophils %    12 H


 


Abs Neuts (Manual)    0.4 L


 


Abs Lymphs (Manual)    0.2 L


 


ABG pH  7.48 H  


 


ABG pO2  53.2 L  


 


ABG HCO3  27.9 H  


 


ABG Total CO2  29.0 H  


 


ABG O2 Saturation  89.9 L  


 


Sodium   


 


Potassium   


 


Chloride   


 


Carbon Dioxide   


 


Glucose   


 


Lactic Acid   6.4 H 


 


Direct Bilirubin   


 


AST   


 


NT-Pro-B Natriuret Pep   


 


Total Protein   


 


Albumin   














  04/04/19 04/04/19





  04:12 04:12


 


WBC  


 


RDW  


 


Plt Count  


 


Band Neutrophils %  


 


Abs Neuts (Manual)  


 


Abs Lymphs (Manual)  


 


ABG pH  


 


ABG pO2  


 


ABG HCO3  


 


ABG Total CO2  


 


ABG O2 Saturation  


 


Sodium  133.3 L 


 


Potassium  3.0 L* 


 


Chloride  88 L 


 


Carbon Dioxide  32 H 


 


Glucose  205 H 


 


Lactic Acid  


 


Direct Bilirubin  0.5 H 


 


AST  86 H 


 


NT-Pro-B Natriuret Pep   2440 H


 


Total Protein  6.1 L 


 


Albumin  3.2 L 














- EKG Interpretation by Me


Additional EKG results interpreted by me: 





04/04/19 04:09


EKG is reviewed and interpreted by me.  EKG shows sinus tachycardia with a rate 

of 141 bpm.  No ST segment elevation or depression.  No ischemic T wave 

inversions.  ID interval, QRS duration, QT intervals are within normal range.  

No old EKG available for comparison.





Critical Care Note





- Critical Care Note


Total time excluding time spent on procedures (mins): 65


Comments: 





Critical care time for this patient not including time spent in procedures 

approximately 65 minutes due to frequent re-evaluations, management of 

tachycardia, management of difficulty breathing, management of neutropenic 

fever.





Discharge





- Discharge


Clinical Impression: 


 Neutropenic fever, Tachycardia





Dyspnea


Qualifiers:


 Dyspnea type: unspecified Qualified Code(s): R06.00 - Dyspnea, unspecified





Pneumonia


Qualifiers:


 Pneumonia type: due to unspecified organism Laterality: bilateral Lung 

location: unspecified part of lung Qualified Code(s): J18.9 - Pneumonia, 

unspecified organism





Condition: Serious


Disposition: ADMITTED AS INPATIENT


Admitting Provider: Hospitalist


Unit Admitted: IMCU


Referrals: 


VERONICA THOMSON MD [Primary Care Provider] - Follow up as needed

## 2019-04-04 NOTE — EKG REPORT
SEVERITY:- ABNORMAL ECG -

SINUS TACHYCARDIA

ATRIAL PREMATURE COMPLEX

RIGHT VENTRICULAR HYPERTROPHY

INFERIOR INFARCT, AGE INDETERMINATE

:

Confirmed by: Graeme Madrigal MD 04-Apr-2019 07:52:04

## 2019-04-04 NOTE — PDOC CONSULTATION
Consultation


Consult Date: 04/04/19


Attending physician:: FER CANNON


Consult reason:: ARDS





History of Present Illness


Admission Date/PCP: 


  04/04/19 08:26





  VERONICA THOMSON MD





History of Present Illness: 


RUSS ANTOINE is a 49 year old female fortunately she has stage IV advanced 

metastatic breast cancer affecting her pleura as well as the lungs against she 

had a chemotherapy recently apparently causing her leukopenia and 

thrombocytopenia current white count is 0.7 thousand platelet count 74,003 

profoundly tachypneic and hypoxemic and is a PaO2/FiO2 ratio of 47 FiO2 of 100% 

radiograph is consistent with ARDS.  Due to her advanced disease her condition 

was discussed with her spouse and Dr. Driss Monaco and was at felt that this 

time she should be a DNR prior interventions with BiPAP had limited to no 

success.  Her prognosis is poor








Past Medical History


Cardiac Medical History: Reports: Myocardial Infarction - EKG SHOWS OLD; PT 

UNAWARE


   Denies: Coronary Artery Disease, Hypertension


Pulmonary Medical History: Reports: Sleep Apnea


   Denies: Asthma, Bronchitis, Chronic Obstructive Pulmonary Disease (COPD), 

Pneumonia


Neurological Medical History: 


   Denies: Seizures


Endocrine Medical History: Reports: Hypothyroidism


Malignancy Medical History: Reports: Breast Cancer - left breast cancer 

metastatic to the axilla status post neoadjuvant therapy


Musculoskeltal Medical History: Reports: Arthritis - hands and knees BACK


Psychiatric Medical History: Reports: Depression


Hematology: 


   Denies: Anemia





Past Surgical History


Past Surgical History: Reports: Orthopedic Surgery - R SHOULDER REPLACEMENT, 

Tubal Ligation





Social History


Smoking Status: Unknown if Ever Smoked





Family History


Family History: Hypertension


Parental Family History Reviewed: No


Children Family History Reviewed: No


Sibling(s) Family History Reviewed.: No





Medication/Allergy


Home Medications: 








Levothyroxine Sodium [Synthroid] 200 mcg PO DAILY 11/15/17 


Sertraline HCl [Zoloft 50 mg Tablet] 50 mg PO DAILY 11/15/17 


Zolpidem Tartrate [Ambien] 10 mg PO HSP PRN 11/15/17 


Ondansetron HCl [Zofran 8 mg Tablet] 8 mg PO Q8HP PRN 04/04/19 


Oxycodone HCl [Oxy-Ir 5 mg Tablet] 1 - 2 tab PO Q4HP PRN 04/04/19 








Allergies/Adverse Reactions: 


                                        





hydrocodone Adverse Reaction (Verified 04/04/19 04:26)


   Generalized Itching











Review of Systems


ROS unobtainable: Due to mental status





Physical Exam


Vital Signs: 


                                        











Temp Pulse Resp BP Pulse Ox


 


 102.8 F H  146 H  35 H  117/89 H  87 L


 


 04/04/19 07:33  04/04/19 03:50  04/04/19 08:55  04/04/19 08:50  04/04/19 08:55








                                 Intake & Output











 04/03/19 04/04/19 04/05/19





 06:59 06:59 06:59


 


Intake Total  2 76


 


Balance  2 76


 


Weight  99.79 kg 











General appearance: PRESENT: disheveled, mild distress, morbidly obese


Head exam: PRESENT: atraumatic, normocephalic


Eye exam: PRESENT: conjunctiva pale, EOMI.  ABSENT: nystagmus, periorbital 

swelling


Mouth exam: PRESENT: dry mucosa, neck supple, tongue midline


Neck exam: ABSENT: carotid bruit, full ROM, JVD, lymphadenopathy, meningismus, 

tenderness, thyromegaly, tracheal deviation, tracheostomy, other


Respiratory exam: PRESENT: crackles, decreased breath sounds, prolonged 

expiratory phas, rhonchi, tachypnea.  ABSENT: retraction, stridor, unlabored


Cardiovascular exam: PRESENT: RRR, rubs, +S2, tachycardia


Pulses: PRESENT: normal radial pulses


GI/Abdominal exam: PRESENT: soft.  ABSENT: tenderness


Gentrourinary exam: PRESENT: indwelling catheter


Extremities exam: ABSENT: calf tenderness, clubbing, joint swelling


Musculoskeletal exam: ABSENT: ambulatory, deformity, dislocation


Neurological exam: PRESENT: altered, awake


Psychiatric exam: PRESENT: flat affect


Skin exam: PRESENT: dry, warm





Results


Laboratory Results: 


                                        





                                 04/04/19 04:12 





                                 04/04/19 04:12 





                                        











  04/04/19 04/04/19 04/04/19





  04:09 04:12 04:12


 


WBC    0.7 L*


 


RBC    4.33


 


Hgb    12.8


 


Hct    38.5


 


MCV    89


 


MCH    29.6


 


MCHC    33.3


 


RDW    14.3 H


 


Plt Count    74 L


 


Seg Neutrophils %    Not Reportable


 


Lymphocytes %    Not Reportable


 


Monocytes %    Not Reportable


 


Eosinophils %    Not Reportable


 


Basophils %    Not Reportable


 


Absolute Neutrophils    Not Reportable


 


Absolute Lymphocytes    Not Reportable


 


Absolute Monocytes    Not Reportable


 


Absolute Eosinophils    Not Reportable


 


Absolute Basophils    Not Reportable


 


Carbonic Acid  1.15  


 


HCO3/H2CO3 Ratio  24:1  


 


ABG pH  7.48 H  


 


ABG pCO2  38.2  


 


ABG pO2  53.2 L  


 


ABG HCO3  27.9 H  


 


ABG O2 Saturation  89.9 L  


 


ABG Base Excess  4.3  


 


FiO2  100%  


 


Sodium   


 


Potassium   


 


Chloride   


 


Carbon Dioxide   


 


Anion Gap   


 


BUN   


 


Creatinine   


 


Est GFR ( Amer)   


 


Est GFR (Non-Af Amer)   


 


Glucose   


 


Lactic Acid   6.4 H 


 


Calcium   


 


Total Bilirubin   


 


AST   


 


ALT   


 


Alkaline Phosphatase   


 


Total Protein   


 


Albumin   


 


Urine Color   


 


Urine Appearance   


 


Urine pH   


 


Ur Specific Gravity   


 


Urine Protein   


 


Urine Glucose (UA)   


 


Urine Ketones   


 


Urine Blood   


 


Urine Nitrite   


 


Ur Leukocyte Esterase   


 


Urine WBC (Auto)   


 


Urine RBC (Auto)   














  04/04/19 04/04/19 04/04/19





  04:12 07:55 08:50


 


WBC   


 


RBC   


 


Hgb   


 


Hct   


 


MCV   


 


MCH   


 


MCHC   


 


RDW   


 


Plt Count   


 


Seg Neutrophils %   


 


Lymphocytes %   


 


Monocytes %   


 


Eosinophils %   


 


Basophils %   


 


Absolute Neutrophils   


 


Absolute Lymphocytes   


 


Absolute Monocytes   


 


Absolute Eosinophils   


 


Absolute Basophils   


 


Carbonic Acid   


 


HCO3/H2CO3 Ratio   


 


ABG pH   


 


ABG pCO2   


 


ABG pO2   


 


ABG HCO3   


 


ABG O2 Saturation   


 


ABG Base Excess   


 


FiO2   


 


Sodium  133.3 L  


 


Potassium  3.0 L*  


 


Chloride  88 L  


 


Carbon Dioxide  32 H  


 


Anion Gap  13  


 


BUN  13  


 


Creatinine  0.72  


 


Est GFR ( Amer)  > 60  


 


Est GFR (Non-Af Amer)  > 60  


 


Glucose  205 H  


 


Lactic Acid   2.7 H 


 


Calcium  8.7  


 


Total Bilirubin  1.0  


 


AST  86 H  


 


ALT  37  


 


Alkaline Phosphatase  104  


 


Total Protein  6.1 L  


 


Albumin  3.2 L  


 


Urine Color    YELLOW


 


Urine Appearance    SLIGHTLY-CLOUDY


 


Urine pH    6.0


 


Ur Specific Gravity    1.023


 


Urine Protein    100 H


 


Urine Glucose (UA)    NEGATIVE


 


Urine Ketones    NEGATIVE


 


Urine Blood    NEGATIVE


 


Urine Nitrite    NEGATIVE


 


Ur Leukocyte Esterase    NEGATIVE


 


Urine WBC (Auto)    27


 


Urine RBC (Auto)    18








                                        











  04/04/19





  04:12


 


Troponin I  0.192


 


NT-Pro-B Natriuret Pep  2440 H











Impressions: 


                                        





Chest X-Ray  04/04/19 04:08


IMPRESSION: Developing bilateral edema and/or pneumonia. 


 














Assessment & Plan





- Diagnosis


(1) Acute and chronic respiratory failure with hypoxia


Is this a current diagnosis for this admission?: Yes   


Plan: 


Patient is DNR able to successfully oxygenate with BiPAP








(2) Pneumonia


Qualifiers: 


   Pneumonia type: due to unspecified organism   Laterality: bilateral   Lung 

location: lower lobe of lung   Qualified Code(s): J18.1 - Lobar pneumonia, 

unspecified organism   


Is this a current diagnosis for this admission?: Yes   


Plan: 


No known etiology at this time 5/5 lobes involved radiographically








(3) Breast cancer, left


Qualifiers: 


   Breast location: upper outer quadrant of breast   Estrogen receptor status: 

negative   Patient sex: female   Qualified Code(s): C50.412 - Malignant neoplasm

of upper-outer quadrant of left female breast; Z17.1 - Estrogen receptor negativ

e status [ER-]   


Is this a current diagnosis for this admission?: Yes   


Plan: 


Stage IV please see oncology note








(4) Sleep apnea syndrome


Qualifiers: 


   Sleep apnea type: unspecified type   Qualified Code(s): G47.30 - Sleep apnea,

unspecified   


Is this a current diagnosis for this admission?: Yes   


Plan: 


continue NIPPV








- Time


Total Critical Time (Minutes): 55

## 2019-04-04 NOTE — RADIOLOGY REPORT (SQ)
Chest single view on  4/4/2019 at 4:23 AM 



CLINICAL INDICATION: Shortness of breath



COMPARISON: 2/16/2017



FINDINGS: Right IJ Port-A-Cath tip is in the SVC. A few overlying

wires are noted. Mild cardiomegaly is noted. There are bilateral

opacities consistent with developing edema and/or pneumonia. No

bony abnormality is noted.



IMPRESSION: Developing bilateral edema and/or pneumonia.

## 2019-04-04 NOTE — ADVANCED CARE
- Diagnosis


(1) Neutropenic fever


Diagnosis Current: Yes   





(2) Acute and chronic respiratory failure with hypoxia


Diagnosis Current: Yes   





(3) Pneumonia


Diagnosis Current: Yes   





(4) Breast cancer, left


Diagnosis Current: Yes   


Resuscitation Status: Do Not Resuscitate


Discussion: 


Discussed w/ pt and family as described in consult note





Time Spent: >70 min

## 2019-04-04 NOTE — PDOC H&P
History of Present Illness


Admission Date/PCP: 


  19 08:26





  VERONICA THOMSON MD





Patient complains of: Increased difficulty breathing, fever and productive cough


History of Present Illness: 


RUSS ANTOINE is a 49 year old female with metastatic breast cancer.  She is 

status post bilateral mastectomies and has metastases to both lungs, to the 

brain (treated with gamma knife therapy) as well as radiation ulcers at the left

mastectomy site.  The patient is actively receiving chemotherapy.  The patient's

 reports that her last chemotherapy was approximately 6 days ago.  She 

began to feel poorly after the treatment but over the last 3 days has 

drastically declined.  She comes in with significantly increased work of 

breathing as well as fever and hypoxia.  She was found to have a large right 

lung infiltrate.  She was placed on BiPAP with an FiO2 of 100%.  She was 

hypotensive but responded to fluids.  She was referred to the hospitalist 

service for admission to the ICU.








Past Medical History


Cardiac Medical History: Reports: Myocardial Infarction - EKG SHOWS OLD; PT 

UNAWARE


   Denies: Coronary Artery Disease, Hypertension


Pulmonary Medical History: Reports: Sleep Apnea


   Denies: Asthma, Bronchitis, Chronic Obstructive Pulmonary Disease (COPD), 

Pneumonia


Neurological Medical History: 


   Denies: Seizures


Endocrine Medical History: Reports: Hypothyroidism, Other - Graves' disease 

status post thyroidectomy


Renal/ Medical History: 


   Denies: Chronic Kidney Disease


Malignancy Medical History: Reports: Breast Cancer - left breast cancer 

metastatic to the axilla status post neoadjuvant therapy


Musculoskeltal Medical History: Reports: Arthritis - hands and knees BACK


Skin Medical History: Reports: Other - Left chest ulcer from radiation therapy.


Psychiatric Medical History: Reports: Depression


Hematology: 


   Denies: Anemia





Past Surgical History


Past Surgical History: Reports:  Section, Mastectomy - Bilateral, 

Orthopedic Surgery - R SHOULDER REPLACEMENT, Thyroidectomy, Tubal Ligation





Social History


Information Source: Patient, Relative - The patient's  provided much of 

the information


Lives with: Family


Smoking Status: Former Smoker


Frequency of Alcohol Use: None


Hx Recreational Drug Use: No


Drugs: None





- Advance Directive


Resuscitation Status: Full Code


Surrogate healthcare decision maker:: 





Had a lengthy discussion with the patient's  and the patient.  The 

patient did have an elevated troponin with evidence on EKG of old infarct.  The 

patient and her  were emphatic about no cardiac catheterization but at 

this point wish to be full code.  The  is the designated decision maker 

if the patient is not capable of making decisions.  No formal documentation is 

in place.





Family History


Family History: Hypertension, Malignancy


Parental Family History Reviewed: Yes - Mother  early in life.  Patient did 

not know her father.


Children Family History Reviewed: Yes


Sibling(s) Family History Reviewed.: Yes - Sister with cancer





Medication/Allergy


Home Medications: 








Levothyroxine Sodium [Synthroid] 200 mcg PO DAILY 11/15/17 


Sertraline HCl [Zoloft 50 mg Tablet] 50 mg PO DAILY 11/15/17 


Zolpidem Tartrate [Ambien] 10 mg PO HSP PRN 11/15/17 


Ondansetron HCl [Zofran 8 mg Tablet] 8 mg PO Q8HP PRN 19 


Oxycodone HCl [Oxy-Ir 5 mg Tablet] 1 - 2 tab PO Q4HP PRN 19 








Allergies/Adverse Reactions: 


                                        





hydrocodone Adverse Reaction (Verified 19 04:26)


   Generalized Itching











Review of Systems


ROS unobtainable: Other - Patient is very tachypneic and difficult to speak.  

BiPAP mask is in place.


Constitutional: PRESENT: chills, fatigue, fever(s)


Eyes: ABSENT: visual disturbances


Ears: ABSENT: hearing changes


Nose, Mouth, and Throat: ABSENT: mouth pain


Cardiovascular: ABSENT: chest pain, palpitations


Respiratory: PRESENT: cough, dyspnea, sputum.  ABSENT: hemoptysis


Gastrointestinal: PRESENT: constipation.  ABSENT: abdominal pain, diarrhea, 

nausea, vomiting


Genitourinary: ABSENT: difficulty urinating, dysuria, hematuria


Musculoskeletal: PRESENT: other - Polyarthralgias


Integumentary: PRESENT: wounds - Ulcerations at the site of the left mastectomy 

likely from radiation


Neurological: ABSENT: abnormal movements, abnormal speech, memory loss, tremor(s

)


Psychiatric: ABSENT: anxiety, depression, hallucinations


Endocrine: ABSENT: cold intolerance, heat intolerance


Hematologic/Lymphatic: ABSENT: easy bruising





Physical Exam


Vital Signs: 


                                        











Temp Pulse Resp BP Pulse Ox


 


 102.8 F H  146 H  35 H  117/89 H  87 L


 


 19 07:33  19 03:50  19 08:55  19 08:50  19 08:55








                                 Intake & Output











 19





 06:59 06:59 06:59


 


Intake Total  2 76


 


Balance  2 76


 


Weight  99.79 kg 











General appearance: PRESENT: severe distress, well-developed


Head exam: PRESENT: atraumatic, normocephalic


Eye exam: PRESENT: conjunctiva pale, periorbital swelling, other - Left pupil 

with limited response to light.  ABSENT: scleral icterus


Ear exam: PRESENT: normal external ear exam


Mouth exam: PRESENT: other - Did not assess


Teeth exam: PRESENT: other - BiPAP mask in place


Neck exam: PRESENT: other - Large neck.  ABSENT: carotid bruit


Respiratory exam: PRESENT: accessory muscle use, decreased breath sounds - 

Shallow breath sounds with limited inspiratory phase, rhonchi - Faint rhonchi on

the right, tachypnea.  ABSENT: rales, wheezes


Cardiovascular exam: PRESENT: +S1, +S2, tachycardia


GI/Abdominal exam: PRESENT: hypoactive bowel sounds, soft.  ABSENT: distended, 

guarding, tenderness


Rectal exam: PRESENT: deferred


Gentrourinary exam: PRESENT: indwelling catheter


Musculoskeletal exam: PRESENT: normal inspection


Neurological exam: PRESENT: alert - Fatigued.  Kept eyes closed for the most 

part likely due to significant tachypnea, awake, oriented to person, oriented to

place, oriented to situation


Psychiatric exam: PRESENT: flat affect.  ABSENT: agitated, anxious


Focused psych exam: ABSENT: delusional, restlessness


Skin exam: PRESENT: other - Several open ulcerated areas on the left chest at 

the site of the mastectomy.  This is irradiated tissue.  There is no active 

drainage as the open areas are dried and crusted.





Results


Laboratory Results: 


                                        





                                 19 04:12 





                                 19 04:12 





                                        











  19





  04:09 04:12 04:12


 


WBC    0.7 L*


 


RBC    4.33


 


Hgb    12.8


 


Hct    38.5


 


MCV    89


 


MCH    29.6


 


MCHC    33.3


 


RDW    14.3 H


 


Plt Count    74 L


 


Seg Neutrophils %    Not Reportable


 


Lymphocytes %    Not Reportable


 


Monocytes %    Not Reportable


 


Eosinophils %    Not Reportable


 


Basophils %    Not Reportable


 


Absolute Neutrophils    Not Reportable


 


Absolute Lymphocytes    Not Reportable


 


Absolute Monocytes    Not Reportable


 


Absolute Eosinophils    Not Reportable


 


Absolute Basophils    Not Reportable


 


Carbonic Acid  1.15  


 


HCO3/H2CO3 Ratio  24:1  


 


ABG pH  7.48 H  


 


ABG pCO2  38.2  


 


ABG pO2  53.2 L  


 


ABG HCO3  27.9 H  


 


ABG O2 Saturation  89.9 L  


 


ABG Base Excess  4.3  


 


FiO2  100%  


 


Sodium   


 


Potassium   


 


Chloride   


 


Carbon Dioxide   


 


Anion Gap   


 


BUN   


 


Creatinine   


 


Est GFR ( Amer)   


 


Est GFR (Non-Af Amer)   


 


Glucose   


 


Lactic Acid   6.4 H 


 


Calcium   


 


Total Bilirubin   


 


AST   


 


ALT   


 


Alkaline Phosphatase   


 


Total Protein   


 


Albumin   














  19





  04:12 07:55


 


WBC  


 


RBC  


 


Hgb  


 


Hct  


 


MCV  


 


MCH  


 


MCHC  


 


RDW  


 


Plt Count  


 


Seg Neutrophils %  


 


Lymphocytes %  


 


Monocytes %  


 


Eosinophils %  


 


Basophils %  


 


Absolute Neutrophils  


 


Absolute Lymphocytes  


 


Absolute Monocytes  


 


Absolute Eosinophils  


 


Absolute Basophils  


 


Carbonic Acid  


 


HCO3/H2CO3 Ratio  


 


ABG pH  


 


ABG pCO2  


 


ABG pO2  


 


ABG HCO3  


 


ABG O2 Saturation  


 


ABG Base Excess  


 


FiO2  


 


Sodium  133.3 L 


 


Potassium  3.0 L* 


 


Chloride  88 L 


 


Carbon Dioxide  32 H 


 


Anion Gap  13 


 


BUN  13 


 


Creatinine  0.72 


 


Est GFR ( Amer)  > 60 


 


Est GFR (Non-Af Amer)  > 60 


 


Glucose  205 H 


 


Lactic Acid   2.7 H


 


Calcium  8.7 


 


Total Bilirubin  1.0 


 


AST  86 H 


 


ALT  37 


 


Alkaline Phosphatase  104 


 


Total Protein  6.1 L 


 


Albumin  3.2 L 








                                        











  19





  04:12


 


Troponin I  0.192


 


NT-Pro-B Natriuret Pep  2440 H











Impressions: 


                                        





Chest X-Ray  19 04:08


IMPRESSION: Developing bilateral edema and/or pneumonia. 


 














Assessment and Plan





- Diagnosis


(1) Sepsis


Qualifiers: 


   Sepsis type: sepsis due to unspecified organism   Qualified Code(s): A41.9 - 

Sepsis, unspecified organism   


Is this a current diagnosis for this admission?: Yes   


Plan: 


Patient has a large right sided pneumonia.  She is severely neutropenic from her

chemotherapy.  There was no reported nausea and vomiting however aspiration 

could occur.  With her severe neutropenia opportunistic bacteria are also a 

consideration.  Because this is a bacterial pneumonia until proven otherwise the

patient was started on vancomycin and cefepime.


She did have hypotension that responded to IV fluids.  Marked tachycardia 

requiring diltiazem infusion.  Significantly increased work of breathing with 

severe hypoxia.  On BiPAP with an FiO2 of 100% there was difficulty keeping the 

patient's oxygen saturation above 90%.  She was febrile with a temperature above

102 F.








(2) Acute respiratory failure with hypoxia


Is this a current diagnosis for this admission?: Yes   


Plan: 


Despite BiPAP the oxygen saturation was marginal even with an FiO2 of 100%.  He 

did have a long discussion with the patient and his wife about intubation in the

current setting.  Her degree of illness with underlying history could make it 

difficult to achieve extubation.  The patient and his wife would like to remain 

full CODE STATUS at this time.  The patient is going to be admitted to the ICU 

and there is a high probability of intubation.








(3) Pneumonia


Qualifiers: 


   Pneumonia type: due to unspecified organism   Laterality: bilateral   Lung 

location: lower lobe of lung   Qualified Code(s): J18.1 - Lobar pneumonia, 

unspecified organism   


Is this a current diagnosis for this admission?: Yes   


Plan: 


Considering the patient's severe neutropenia this is a bacterial pneumonia until

proven otherwise.  It could be community-acquired bacteria such as Streptococcus

versus opportunistic organisms including oral uche from silent aspiration.  The

patient will be placed on vancomycin and cefepime.  Nebulizer treatments will be

available as needed.








(4) Elevated troponin I level


Is this a current diagnosis for this admission?: Yes   


Plan: 


The EKG demonstrated an inferior infarct of undetermined age.  The patient does 

not recall a myocardial infarction.  The troponin is elevated at 0.19.  We will 

perform serial troponins as this may help in selection of medications to treat 

the patient.  She is on diltiazem infusion for the marked tachycardia.








(5) Hypotension


Qualifiers: 


   Hypotension type: other hypotension type   Qualified Code(s): I95.89 - Other 

hypotension   


Is this a current diagnosis for this admission?: Yes   


Plan: 


Hypotension due to sepsis and possible hypovolemia.  The patient did respond to 

IV fluids but she is a full code and vasopressors will be instituted if we are 

not able to maintain reasonable blood pressure.  Hopefully slowing the heart 

rate will also improve her underlying blood pressure.








(6) Neutropenic fever


Is this a current diagnosis for this admission?: Yes   


Plan: 


Marked neutropenia with a white blood cell count of 0.4.  She will be on 

neutropenic precautions including neutropenic diet.  Tylenol has not been 

terribly effective.  I have ordered ibuprofen to be available as well.  She may 

need a cooling blanket.








(7) Lactic acidemia


Is this a current diagnosis for this admission?: Yes   


Plan: 


The patient exhibits marked lactic acidosis with a serum lactic acid level of 

6.4.  She is getting IV fluids and we will recheck a lactic acid level in 4-5 

hours.








(8) Tachycardia


Is this a current diagnosis for this admission?: Yes   


Plan: 


The patient is on a diltiazem infusion.  We will increase the dose slowly.  If 

she becomes hypotensive we will add pressor therapy.  If she tolerates we will 

continue to increase.  Hopefully with better rate control her blood pressure was

improved as well.  This is a response to her sepsis and fluids and antibiotic 

therapy have been started as well.








(9) Thrombocytopenia


Is this a current diagnosis for this admission?: Yes   


Plan: 


The patient's platelet count is only 74,000.  She is receiving chemotherapy so 

it is difficult to know which factor is predominant.  We will monitor her 

platelet count and watch for evidence of bleeding.








(10) Breast cancer metastasized to lung


Qualifiers: 


   Laterality: left   Qualified Code(s): C50.912 - Malignant neoplasm of 

unspecified site of left female breast; C78.02 - Secondary malignant neoplasm of

left lung   


Is this a current diagnosis for this admission?: Yes   


Plan: 


The patient is being treated for metastases to both lungs.  Unfortunately she 

also has had metastatic disease to the brain that was treated with gamma knife 

therapy.  There are ulcerations on the skin at the site of the left mastectomy. 

These are likely radiation induced.  The radiation tissue is extremely hard to 

heal up.  I have ordered Santyl to the wound beds with protective covering to 

try and soften the surface of the lesion and attempt to promote healing.








(11) Hypokalemia


Is this a current diagnosis for this admission?: Yes   


Plan: 


Serum potassium is only 3.0.  With aggressive hydration expect her serum 

potassium to decrease.  We will supplement potassium and place the patient on an

electrolyte replacement protocol in the ICU.








- Time


Time Spent with patient: 





90 minutes


Time Spent with patient: 35 or more minutes


Medications reviewed and adjusted accordingly: Yes





- Inpatient Certification


Based on my medical assessment, after consideration of the patient's 

comorbidities, presenting symptoms, or acuity I expect that the services needed 

warrant INPATIENT care.: Yes


I certify that my determination is in accordance with my understanding of 

Medicare's requirements for reasonable and necessary INPATIENT services [42 CFR 

412.3e].: Yes


Medical Necessity: Significant Comorbidiites Make Outpatient Treatment Too 

Risky, Need Close Monitoring Due to Risk of Patient Decompensation, Need For IV 

Fluids, Need For Continuous Telemetry Monitoring, Need for Pain Control, Need 

for IV Antibiotics

## 2019-04-04 NOTE — PDOC CONSULTATION
Consultation


Consult Date: 04/04/19


Attending physician:: FER CANNON


Consult reason:: Stage IV breast ca here w/ resp failure, sepsis, fever





History of Present Illness


Admission Date/PCP: 


  04/04/19 08:26





  VERONICA THOMSON MD





Patient complains of: Fever, chills, SOB, weakness


History of Present Illness: 


RUSS ANTOINE is a 49 year old female w/ known hx of stage IV breast ca, 

multiple chest wall lesions, pleural mets w/ effusion, liver mets, brain mets 

s/p gamma knife completed to brain now 3 wks ago. We gave her cycle #1 of 2nd 

line chemotherapy with HALAVEN, day #8 1 wk ago. 





She had progressive [place]/SOB over last 24 hours,  called me this am noted 

she was in resp distress while on home 02, instructed him to bring her to ED. 

Here she was satting only 60% on 2L, inc to 6L but still only satting low 80s so

placed on BIPAP. RR was high and pt was tiring so initially ED physician and 

subsequent hospitalist both spoke with  who initially wanted to approach 

with intubation. 





I saw pt at bedside, spoke with pt and  at length. Nurse was at bedside 

also. She voiced to us that she was tired, she asked prognosis, we had a 

discussion about this. She noted that she would not want intubation if there was

not a good chance of recovery and that she felt that she was ready to just be 

comfortable.  was initially unsure of this approach but after discussion 

he seemed to accept pt's decision. 





I placed DNR on chart. 








Past Medical History


Cardiac Medical History: Reports: Myocardial Infarction - EKG SHOWS OLD; PT 

UNAWARE


   Denies: Coronary Artery Disease, Hypertension


Pulmonary Medical History: Reports: Sleep Apnea


   Denies: Asthma, Bronchitis, Chronic Obstructive Pulmonary Disease (COPD), 

Pneumonia


Neurological Medical History: 


   Denies: Seizures


Endocrine Medical History: Reports: Hypothyroidism


Malignancy Medical History: Reports: Breast Cancer - left breast cancer 

metastatic to the axilla status post neoadjuvant therapy


Musculoskeltal Medical History: Reports: Arthritis - hands and knees BACK


Psychiatric Medical History: Reports: Depression


Hematology: 


   Denies: Anemia





Past Surgical History


Past Surgical History: Reports: Orthopedic Surgery - R SHOULDER REPLACEMENT, 

Tubal Ligation, Other - mastectomy





Social History


Information Source: Patient


Smoking Status: Never Smoker


Frequency of Alcohol Use: None


Hx Recreational Drug Use: No





- Advance Directive


Resuscitation Status: Do Not Resuscitate





Family History


Family History: Hypertension


Parental Family History Reviewed: Yes


Children Family History Reviewed: Yes


Sibling(s) Family History Reviewed.: Yes





Medication/Allergy


Home Medications: 








Levothyroxine Sodium [Synthroid] 200 mcg PO DAILY 11/15/17 


Sertraline HCl [Zoloft 50 mg Tablet] 50 mg PO DAILY 11/15/17 


Zolpidem Tartrate [Ambien] 10 mg PO HSP PRN 11/15/17 


Ondansetron HCl [Zofran 8 mg Tablet] 8 mg PO Q8HP PRN 04/04/19 


Oxycodone HCl [Oxy-Ir 5 mg Tablet] 1 - 2 tab PO Q4HP PRN 04/04/19 








Allergies/Adverse Reactions: 


                                        





hydrocodone Adverse Reaction (Verified 04/04/19 04:26)


   Generalized Itching











Review of Systems


Constitutional: PRESENT: anorexia, fatigue, weakness, weight loss


Cardiovascular: PRESENT: dyspnea on exertion


Respiratory: PRESENT: cough, dyspnea


Gastrointestinal: ABSENT: abdominal pain, constipation, diarrhea, hematemesis, 

hematochezia, nausea, vomiting


Musculoskeletal: ABSENT: joint swelling


Neurological: PRESENT: abnormal gait, lack of coordination, weakness





Physical Exam


Vital Signs: 


                                        











Temp Pulse Resp BP Pulse Ox


 


 102.0 F H  131 H  42 H  153/134 H  90 L


 


 04/04/19 10:18  04/04/19 12:27  04/04/19 12:27  04/04/19 10:18  04/04/19 10:20








                                 Intake & Output











 04/03/19 04/04/19 04/05/19





 06:59 06:59 06:59


 


Intake Total  2 774


 


Balance  2 774


 


Weight  99.79 kg 98.1 kg











General appearance: PRESENT: mild distress - 2nd SOB


Head exam: PRESENT: atraumatic


Mouth exam: PRESENT: dry mucosa


Respiratory exam: PRESENT: accessory muscle use


Cardiovascular exam: PRESENT: tachycardia


GI/Abdominal exam: PRESENT: normal bowel sounds, soft.  ABSENT: distended, 

guarding, mass, organolmegaly, rebound, tenderness


Rectal exam: PRESENT: deferred


Neurological exam: PRESENT: alert, altered, awake, oriented to person, oriented 

to place, oriented to time, oriented to situation


Psychiatric exam: PRESENT: anxious





Results


Laboratory Results: 


                                        





                                 04/04/19 04:12 





                                 04/04/19 04:12 





                                        











  04/04/19 04/04/19 04/04/19





  04:09 04:12 04:12


 


WBC    0.7 L*


 


RBC    4.33


 


Hgb    12.8


 


Hct    38.5


 


MCV    89


 


MCH    29.6


 


MCHC    33.3


 


RDW    14.3 H


 


Plt Count    74 L


 


Seg Neutrophils %    Not Reportable


 


Lymphocytes %    Not Reportable


 


Monocytes %    Not Reportable


 


Eosinophils %    Not Reportable


 


Basophils %    Not Reportable


 


Absolute Neutrophils    Not Reportable


 


Absolute Lymphocytes    Not Reportable


 


Absolute Monocytes    Not Reportable


 


Absolute Eosinophils    Not Reportable


 


Absolute Basophils    Not Reportable


 


Carbonic Acid  1.15  


 


HCO3/H2CO3 Ratio  24:1  


 


ABG pH  7.48 H  


 


ABG pCO2  38.2  


 


ABG pO2  53.2 L  


 


ABG HCO3  27.9 H  


 


ABG O2 Saturation  89.9 L  


 


ABG Base Excess  4.3  


 


FiO2  100%  


 


Sodium   


 


Potassium   


 


Chloride   


 


Carbon Dioxide   


 


Anion Gap   


 


BUN   


 


Creatinine   


 


Est GFR ( Amer)   


 


Est GFR (Non-Af Amer)   


 


Glucose   


 


Lactic Acid   6.4 H 


 


Calcium   


 


Total Bilirubin   


 


AST   


 


ALT   


 


Alkaline Phosphatase   


 


Total Protein   


 


Albumin   


 


Urine Color   


 


Urine Appearance   


 


Urine pH   


 


Ur Specific Gravity   


 


Urine Protein   


 


Urine Glucose (UA)   


 


Urine Ketones   


 


Urine Blood   


 


Urine Nitrite   


 


Ur Leukocyte Esterase   


 


Urine WBC (Auto)   


 


Urine RBC (Auto)   














  04/04/19 04/04/19 04/04/19





  04:12 07:55 08:50


 


WBC   


 


RBC   


 


Hgb   


 


Hct   


 


MCV   


 


MCH   


 


MCHC   


 


RDW   


 


Plt Count   


 


Seg Neutrophils %   


 


Lymphocytes %   


 


Monocytes %   


 


Eosinophils %   


 


Basophils %   


 


Absolute Neutrophils   


 


Absolute Lymphocytes   


 


Absolute Monocytes   


 


Absolute Eosinophils   


 


Absolute Basophils   


 


Carbonic Acid   


 


HCO3/H2CO3 Ratio   


 


ABG pH   


 


ABG pCO2   


 


ABG pO2   


 


ABG HCO3   


 


ABG O2 Saturation   


 


ABG Base Excess   


 


FiO2   


 


Sodium  133.3 L  


 


Potassium  3.0 L*  


 


Chloride  88 L  


 


Carbon Dioxide  32 H  


 


Anion Gap  13  


 


BUN  13  


 


Creatinine  0.72  


 


Est GFR ( Amer)  > 60  


 


Est GFR (Non-Af Amer)  > 60  


 


Glucose  205 H  


 


Lactic Acid   2.7 H 


 


Calcium  8.7  


 


Total Bilirubin  1.0  


 


AST  86 H  


 


ALT  37  


 


Alkaline Phosphatase  104  


 


Total Protein  6.1 L  


 


Albumin  3.2 L  


 


Urine Color    YELLOW


 


Urine Appearance    SLIGHTLY-CLOUDY


 


Urine pH    6.0


 


Ur Specific Gravity    1.023


 


Urine Protein    100 H


 


Urine Glucose (UA)    NEGATIVE


 


Urine Ketones    NEGATIVE


 


Urine Blood    NEGATIVE


 


Urine Nitrite    NEGATIVE


 


Ur Leukocyte Esterase    NEGATIVE


 


Urine WBC (Auto)    27


 


Urine RBC (Auto)    18














  04/04/19





  10:10


 


WBC 


 


RBC 


 


Hgb 


 


Hct 


 


MCV 


 


MCH 


 


MCHC 


 


RDW 


 


Plt Count 


 


Seg Neutrophils % 


 


Lymphocytes % 


 


Monocytes % 


 


Eosinophils % 


 


Basophils % 


 


Absolute Neutrophils 


 


Absolute Lymphocytes 


 


Absolute Monocytes 


 


Absolute Eosinophils 


 


Absolute Basophils 


 


Carbonic Acid  1.09


 


HCO3/H2CO3 Ratio  28:1


 


ABG pH  7.56 H


 


ABG pCO2  36.1


 


ABG pO2  47.8 L


 


ABG HCO3  31.3 H


 


ABG O2 Saturation  88.9 L


 


ABG Base Excess  8.7


 


FiO2  100%


 


Sodium 


 


Potassium 


 


Chloride 


 


Carbon Dioxide 


 


Anion Gap 


 


BUN 


 


Creatinine 


 


Est GFR ( Amer) 


 


Est GFR (Non-Af Amer) 


 


Glucose 


 


Lactic Acid 


 


Calcium 


 


Total Bilirubin 


 


AST 


 


ALT 


 


Alkaline Phosphatase 


 


Total Protein 


 


Albumin 


 


Urine Color 


 


Urine Appearance 


 


Urine pH 


 


Ur Specific Gravity 


 


Urine Protein 


 


Urine Glucose (UA) 


 


Urine Ketones 


 


Urine Blood 


 


Urine Nitrite 


 


Ur Leukocyte Esterase 


 


Urine WBC (Auto) 


 


Urine RBC (Auto) 








                                        











  04/04/19





  04:12


 


Troponin I  0.192


 


NT-Pro-B Natriuret Pep  2440 H











Impressions: 


                                        





Chest X-Ray  04/04/19 04:08


IMPRESSION: Developing bilateral edema and/or pneumonia. 


 














Assessment & Plan





- Diagnosis


(1) Neutropenic fever


Is this a current diagnosis for this admission?: Yes   


Plan: 


Pt currently on broad spectrum atbx. Continue for now,  is to contact 

rest of family. IF pt worsens, will not escalate care beyond current atbx. If 

 and family ready to d/c all therapy, we would support decision.








(2) Acute and chronic respiratory failure with hypoxia


Is this a current diagnosis for this admission?: Yes   


Plan: 


2nd to lung involvement w/ breast ca and likely pneumonia also. Con't w/ BIPAP 

but will not escalate care.








(3) Pneumonia


Qualifiers: 


   Pneumonia type: due to unspecified organism   Laterality: bilateral   Lung 

location: lower lobe of lung   Qualified Code(s): J18.1 - Lobar pneumonia, 

unspecified organism   


Is this a current diagnosis for this admission?: Yes   


Plan: 


Immunocompromised, cont broad spec atbx until  and family ready for full 

comfort care.








(4) Breast cancer, left


Qualifiers: 


   Breast location: upper outer quadrant of breast   Estrogen receptor status: 

negative   Patient sex: female   Qualified Code(s): C50.412 - Malignant neoplasm

of upper-outer quadrant of left female breast; Z17.1 - Estrogen receptor 

negative status [ER-]   


Is this a current diagnosis for this admission?: Yes   


Plan: 


Stage IV breast ca, will not continued any further chemo/rx. Hospice and comfort

care appropriate.  is coming to this realization. Continue atbx and BIPAP

for now. WOuld not escalate further. Follow for next 24 hours and when  

and family ready, can proceed w/ comfort measures








- Time


Time Spent: Greater than 70 Minutes





- Inpatient Certification


Based on my medical assessment, after consideration of the patient's comorbid

ities, presenting symptoms, or acuity I expect that the services needed warrant 

INPATIENT care.: Yes


I certify that my determination is in accordance with my understanding of 

Medicare's requirements for reasonable and necessary INPATIENT services [42 CFR 

412.3e].: Yes


Medical Necessity: Need For Continuous Telemetry Monitoring, Need for IV 

Antibiotics, Risk of Complication if Not Cared For in Hospital, Risk of 

Diagnosis Which Will Require Inpatient Eval/Care/Monitoring

## 2019-04-04 NOTE — DEATH SUMMARY
Death Summary


Date : 19


Time of Death:: 17:16


Autopsy: No


Resuscitation Status: Comfort Measures Only





- Final Diagnosis


(1) Sepsis


Is this a current diagnosis for this admission?: Yes   





(2) Acute respiratory failure with hypoxia


Is this a current diagnosis for this admission?: Yes   





(3) Pneumonia


Is this a current diagnosis for this admission?: Yes   





(4) Elevated troponin I level


Is this a current diagnosis for this admission?: Yes   





(5) Hypotension


Is this a current diagnosis for this admission?: Yes   





(6) Neutropenic fever


Is this a current diagnosis for this admission?: Yes   





(7) Lactic acidemia


Is this a current diagnosis for this admission?: Yes   





(8) Tachycardia


Is this a current diagnosis for this admission?: Yes   





(9) Thrombocytopenia


Is this a current diagnosis for this admission?: Yes   





(10) Breast cancer metastasized to lung


Is this a current diagnosis for this admission?: Yes   





(11) Hypokalemia


Is this a current diagnosis for this admission?: Yes   


Hospital Course:: 


The patient had a very difficult day.  Despite very aggressive interventions 

with fluid, diltiazem infusion, antibiotics and antipyretics the patient failed 

to improve.  After the discussion about comfort measures we did elect to 

continue on her current course to try and allow time for children to travel to 

the bedside.  The ICU nurse phone me at approximately 4:30 PM.  The patient had 

a significant surgeon blood pressure and then began to decompensate.  I 

instructed the staff to utilize the intravenous morphine that is available.  I 

told her to inform the patient's  and that it would be best to withdraw 

medications as she was actively dying.  When I arrived in the ICU the patient 

had  at 5:16 PM.  We did notify Dr. NELIDA jenkins.


Death certificate was completed.

## 2020-08-31 NOTE — RADIOLOGY REPORT (SQ)
EXAM DESCRIPTION:  MRI HEAD COMBO



COMPLETED DATE/TIME:  1/28/2019 5:38 pm



REASON FOR STUDY:  R26.81 UNSTEADINESS ON FEET R26.89 OTHER ABNORMALITIES OF GAIT AND MOBILITY C50.41
2  MALIG NEOPLASM OF UPPER-OUTER QUADRANT OF LEFT FEMAL R26.81  UNSTEADINESS ON FEET R26.89  OTHER AB
NORMALITIES OF GAIT AND MOBILITY



COMPARISON:  PET-CT 1/13/2019



TECHNIQUE:  Multiplanar imaging includes noncontrasted T1, T2, FLAIR, diffusion with ADC map and post
gadolinium contrast T1 sequences. Images stored on PACS.



CONTRAST TYPE AND DOSE:  20 mL Dotarem.



RENAL FUNCTION:  Creatinine 0.7 GFR greater than 60



LIMITATIONS:  None.



FINDINGS:  Multisequence imaging shows a lesion in the left thalamus extending into the brainstem.  T
his shows rim enhancement with suggestion of central necrosis.  This measures about 2 cm in AP diamet
er.  This is 2.6 cm in cephalocaudal dimension.  No other enhancing lesions are seen.  The ventricles
 are normal.  No vascular anomalies are seen.  The cerebellum is normal.  The internal auditory canal
s and cerebellopontine angles are normal.



IMPRESSION:  There is an enhancing lesion on the left extending from the thalamus into the brainstem.
  This demonstrates rim enhancement with central necrosis suggested.

EVIDENCE OF ACUTE STROKE: NO.



TECHNICAL DOCUMENTATION:  JOB ID:  [id number]

 2011 [employer]- All Rights Reserved



Reading location - IP/workstation name: [email or web address] - [email or web address]  - Pepcid  - PT and OT evaluation